# Patient Record
Sex: FEMALE | Race: WHITE | URBAN - METROPOLITAN AREA
[De-identification: names, ages, dates, MRNs, and addresses within clinical notes are randomized per-mention and may not be internally consistent; named-entity substitution may affect disease eponyms.]

---

## 2017-01-19 ENCOUNTER — TELEPHONE (OUTPATIENT)
Dept: INTERNAL MEDICINE CLINIC | Facility: CLINIC | Age: 47
End: 2017-01-19

## 2017-01-19 ENCOUNTER — LAB ENCOUNTER (OUTPATIENT)
Dept: LAB | Age: 47
End: 2017-01-19
Attending: INTERNAL MEDICINE
Payer: COMMERCIAL

## 2017-01-19 DIAGNOSIS — Z00.00 ROUTINE HEALTH MAINTENANCE: ICD-10-CM

## 2017-01-19 DIAGNOSIS — R53.83 FATIGUE, UNSPECIFIED TYPE: ICD-10-CM

## 2017-01-19 LAB
ALT SERPL-CCNC: 22 U/L (ref 14–54)
ANION GAP SERPL CALC-SCNC: 11 MMOL/L (ref 0–18)
AST SERPL-CCNC: 24 U/L (ref 15–41)
BASOPHILS # BLD: 0 K/UL (ref 0–0.2)
BASOPHILS NFR BLD: 0 %
BUN SERPL-MCNC: 11 MG/DL (ref 8–20)
BUN/CREAT SERPL: 14.3 (ref 10–20)
CALCIUM SERPL-MCNC: 9.5 MG/DL (ref 8.5–10.5)
CHLORIDE SERPL-SCNC: 101 MMOL/L (ref 95–110)
CHOLEST SERPL-MCNC: 157 MG/DL (ref 110–200)
CO2 SERPL-SCNC: 28 MMOL/L (ref 22–32)
CREAT SERPL-MCNC: 0.77 MG/DL (ref 0.5–1.5)
EOSINOPHIL # BLD: 0.1 K/UL (ref 0–0.7)
EOSINOPHIL NFR BLD: 1 %
ERYTHROCYTE [DISTWIDTH] IN BLOOD BY AUTOMATED COUNT: 11.3 % (ref 11–15)
GLUCOSE SERPL-MCNC: 93 MG/DL (ref 70–99)
HCT VFR BLD AUTO: 42.3 % (ref 35–48)
HDLC SERPL-MCNC: 59 MG/DL
HGB BLD-MCNC: 14.3 G/DL (ref 12–16)
LDLC SERPL CALC-MCNC: 90 MG/DL (ref 0–99)
LYMPHOCYTES # BLD: 1.1 K/UL (ref 1–4)
LYMPHOCYTES NFR BLD: 15 %
MCH RBC QN AUTO: 30 PG (ref 27–32)
MCHC RBC AUTO-ENTMCNC: 33.8 G/DL (ref 32–37)
MCV RBC AUTO: 88.5 FL (ref 80–100)
MONOCYTES # BLD: 0.4 K/UL (ref 0–1)
MONOCYTES NFR BLD: 6 %
NEUTROPHILS # BLD AUTO: 5.8 K/UL (ref 1.8–7.7)
NEUTROPHILS NFR BLD: 78 %
NONHDLC SERPL-MCNC: 98 MG/DL
OSMOLALITY UR CALC.SUM OF ELEC: 289 MOSM/KG (ref 275–295)
PLATELET # BLD AUTO: 243 K/UL (ref 140–400)
PMV BLD AUTO: 10 FL (ref 7.4–10.3)
POTASSIUM SERPL-SCNC: 4 MMOL/L (ref 3.3–5.1)
RBC # BLD AUTO: 4.78 M/UL (ref 3.7–5.4)
SODIUM SERPL-SCNC: 140 MMOL/L (ref 136–144)
TRIGL SERPL-MCNC: 38 MG/DL (ref 1–149)
TSH SERPL-ACNC: 0.92 UIU/ML (ref 0.34–5.6)
WBC # BLD AUTO: 7.4 K/UL (ref 4–11)

## 2017-01-19 PROCEDURE — 84460 ALANINE AMINO (ALT) (SGPT): CPT

## 2017-01-19 PROCEDURE — 36415 COLL VENOUS BLD VENIPUNCTURE: CPT

## 2017-01-19 PROCEDURE — 80061 LIPID PANEL: CPT

## 2017-01-19 PROCEDURE — 80048 BASIC METABOLIC PNL TOTAL CA: CPT

## 2017-01-19 PROCEDURE — 84443 ASSAY THYROID STIM HORMONE: CPT

## 2017-01-19 PROCEDURE — 82306 VITAMIN D 25 HYDROXY: CPT

## 2017-01-19 PROCEDURE — 85025 COMPLETE CBC W/AUTO DIFF WBC: CPT

## 2017-01-19 PROCEDURE — 84450 TRANSFERASE (AST) (SGOT): CPT

## 2017-01-20 LAB — 25(OH)D3 SERPL-MCNC: 47 NG/ML

## 2017-01-20 NOTE — TELEPHONE ENCOUNTER
Patient called back - relayed DR. ANDERSON message and she verbalized understanding.  Copy of labs mailed to patients home per request

## 2017-02-02 ENCOUNTER — HOSPITAL ENCOUNTER (OUTPATIENT)
Dept: MAMMOGRAPHY | Facility: HOSPITAL | Age: 47
Discharge: HOME | End: 2017-02-02
Attending: INTERNAL MEDICINE
Payer: COMMERCIAL

## 2017-02-02 DIAGNOSIS — Z12.31 ENCOUNTER FOR SCREENING MAMMOGRAM FOR BREAST CANCER: ICD-10-CM

## 2017-02-02 DIAGNOSIS — R92.2 DENSE BREASTS: ICD-10-CM

## 2017-02-02 PROCEDURE — 77063 BREAST TOMOSYNTHESIS BI: CPT

## 2017-02-02 PROCEDURE — 77067 SCR MAMMO BI INCL CAD: CPT

## 2017-02-07 ENCOUNTER — HOSPITAL ENCOUNTER (OUTPATIENT)
Dept: MAMMOGRAPHY | Facility: HOSPITAL | Age: 47
Discharge: HOME | End: 2017-02-07
Attending: INTERNAL MEDICINE
Payer: COMMERCIAL

## 2017-02-07 DIAGNOSIS — R92.8 ABNORMAL MAMMOGRAM: ICD-10-CM

## 2017-02-07 PROCEDURE — 77065 DX MAMMO INCL CAD UNI: CPT | Performed by: RADIOLOGY

## 2017-02-16 ENCOUNTER — TELEPHONE (OUTPATIENT)
Dept: INTERNAL MEDICINE CLINIC | Facility: CLINIC | Age: 47
End: 2017-02-16

## 2017-02-16 DIAGNOSIS — R92.0 MICROCALCIFICATION OF LEFT BREAST ON MAMMOGRAM: Primary | ICD-10-CM

## 2017-02-16 NOTE — TELEPHONE ENCOUNTER
Please let pt know that her additional mammo views were normal -- benign appearing calcifications. The radiologist, however, is recommending repeat views of left breast in 6 months to ensure that they remain stable.   I have ordered in the system -- please

## 2017-05-08 ENCOUNTER — TELEPHONE (OUTPATIENT)
Dept: INTERNAL MEDICINE CLINIC | Facility: CLINIC | Age: 47
End: 2017-05-08

## 2017-05-08 NOTE — TELEPHONE ENCOUNTER
Pt. Is calling to see if we have a booklet called 5 wishes (advanced directive) if so pt. Would like 5 copies left at the .   Ph. # [phone]    Routed to clinical

## 2017-05-08 NOTE — TELEPHONE ENCOUNTER
Called patient and explained that our office does not have booklet , but she could discuss with DR. ANDERSON - verbalized understanding

## 2017-09-26 ENCOUNTER — TELEPHONE (OUTPATIENT)
Dept: INTERNAL MEDICINE CLINIC | Facility: CLINIC | Age: 47
End: 2017-09-26

## 2017-09-26 NOTE — TELEPHONE ENCOUNTER
Pt is experiencing foot pain, it is getting more constant. Pt met her family deductible she would like to get Orthotics,   Pt would like a recommendation of a Dr she should see.    Please call [phone] ok to leave voicemail   Tasked to nursing

## 2017-09-27 NOTE — TELEPHONE ENCOUNTER
Called patient and relayed DR. S message # for DR. Rebeca Hitchcock given [phone] with address and number for DR. Nicole Mcclelland [phone] with address given -left on VM per hipaa

## 2017-12-06 ENCOUNTER — OFFICE VISIT (OUTPATIENT)
Dept: INTERNAL MEDICINE CLINIC | Facility: CLINIC | Age: 47
End: 2017-12-06

## 2017-12-06 VITALS
SYSTOLIC BLOOD PRESSURE: 114 MMHG | TEMPERATURE: 98 F | WEIGHT: 113 LBS | HEART RATE: 108 BPM | BODY MASS INDEX: 16.74 KG/M2 | HEIGHT: 69 IN | DIASTOLIC BLOOD PRESSURE: 76 MMHG

## 2017-12-06 DIAGNOSIS — Z00.00 ROUTINE HEALTH MAINTENANCE: Primary | ICD-10-CM

## 2017-12-06 DIAGNOSIS — Z78.0 POSTMENOPAUSE: ICD-10-CM

## 2017-12-06 DIAGNOSIS — R53.83 OTHER FATIGUE: ICD-10-CM

## 2017-12-06 DIAGNOSIS — R01.1 MURMUR: ICD-10-CM

## 2017-12-06 PROCEDURE — 99396 PREV VISIT EST AGE 40-64: CPT | Performed by: INTERNAL MEDICINE

## 2017-12-06 RX ORDER — MELATONIN
1000 DAILY
COMMUNITY
End: 2021-11-23

## 2017-12-06 NOTE — PROGRESS NOTES
Estefany Rey is a 49year old female. Patient presents with:  Physical: Patient is here today for a PE. Last pap exam was negative in 2/2014. She states that she has been feeling good lately.  She complains of a tenderness underneath her right breast and s LEFT   Family History   Problem Relation Age of Onset   • Depression Maternal Grandmother    • Migraines Sister    • Heart Disease Other      family h/o CAD   • Lipids Other    • Allergies Cousin    • Asthma Cousin       Social History:   Smoking status: N postmenopausal). Check labs. Lipids normal last year.      2. Dyspareunia in female  Due to vaginal dryness. KY jelly did not help. Rec Replens lubricant. Also discussed vaginal estrogen. Pt will think about it and let me know.      3.  Murmur  Check

## 2017-12-13 ENCOUNTER — TELEPHONE (OUTPATIENT)
Dept: INTERNAL MEDICINE CLINIC | Facility: CLINIC | Age: 47
End: 2017-12-13

## 2017-12-13 DIAGNOSIS — R92.2 DENSE BREASTS: ICD-10-CM

## 2017-12-13 DIAGNOSIS — R92.0 MICROCALCIFICATION OF LEFT BREAST ON MAMMOGRAM: Primary | ICD-10-CM

## 2017-12-13 NOTE — TELEPHONE ENCOUNTER
To Dr Shy eLi review message below.   Bilateral diagnostic mammogram order pended below with dx microcalcification of left breast

## 2017-12-13 NOTE — TELEPHONE ENCOUNTER
Per Eloy De Leon in [facility]  Pt has appt this Friday for left breast mammo  Can Dr Ihsan Cazares please order diagnostic bilateral for pt since she is due in Feb. Eloy De Leon states she will be covered under ins  To clinical

## 2017-12-15 ENCOUNTER — HOSPITAL ENCOUNTER (OUTPATIENT)
Dept: MAMMOGRAPHY | Facility: HOSPITAL | Age: 47
Discharge: HOME | End: 2017-12-15
Attending: INTERNAL MEDICINE
Payer: COMMERCIAL

## 2017-12-15 ENCOUNTER — HOSPITAL ENCOUNTER (OUTPATIENT)
Dept: BONE DENSITY | Facility: HOSPITAL | Age: 47
Discharge: HOME | End: 2017-12-15
Attending: INTERNAL MEDICINE
Payer: COMMERCIAL

## 2017-12-15 ENCOUNTER — HOSPITAL ENCOUNTER (OUTPATIENT)
Dept: CV DIAGNOSTICS | Facility: HOSPITAL | Age: 47
Discharge: HOME | End: 2017-12-15
Attending: INTERNAL MEDICINE
Payer: COMMERCIAL

## 2017-12-15 DIAGNOSIS — R01.1 MURMUR: ICD-10-CM

## 2017-12-15 DIAGNOSIS — Z78.0 POSTMENOPAUSE: ICD-10-CM

## 2017-12-15 DIAGNOSIS — R92.0 MICROCALCIFICATION OF LEFT BREAST ON MAMMOGRAM: ICD-10-CM

## 2017-12-15 PROCEDURE — 93306 TTE W/DOPPLER COMPLETE: CPT | Performed by: INTERNAL MEDICINE

## 2017-12-15 PROCEDURE — 77080 DXA BONE DENSITY AXIAL: CPT | Performed by: INTERNAL MEDICINE

## 2017-12-15 PROCEDURE — 77065 DX MAMMO INCL CAD UNI: CPT | Performed by: INTERNAL MEDICINE

## 2017-12-26 ENCOUNTER — PATIENT MESSAGE (OUTPATIENT)
Dept: INTERNAL MEDICINE CLINIC | Facility: CLINIC | Age: 47
End: 2017-12-26

## 2018-02-26 ENCOUNTER — TELEPHONE (OUTPATIENT)
Dept: INTERNAL MEDICINE CLINIC | Facility: CLINIC | Age: 48
End: 2018-02-26

## 2018-02-26 DIAGNOSIS — N39.0 ACUTE UTI: Primary | ICD-10-CM

## 2018-02-26 DIAGNOSIS — R74.8 ELEVATED LIVER ENZYMES: ICD-10-CM

## 2018-02-26 RX ORDER — NITROFURANTOIN 25; 75 MG/1; MG/1
100 CAPSULE ORAL 2 TIMES DAILY
Qty: 10 CAPSULE | Refills: 0 | Status: SHIPPED | OUTPATIENT
Start: 2018-02-26 | End: 2018-04-12

## 2018-02-26 NOTE — TELEPHONE ENCOUNTER
To nursing, I note message--Dysuria and cloudy urine–UA and urine culture ordered. Can send in Rx Macrobid 100 mg twice daily #10. Thanks.     - URINE CULTURE, ROUTINE; Future  - URINALYSIS, ROUTINE; Future

## 2018-02-26 NOTE — TELEPHONE ENCOUNTER
[facility]. I[facility]Y message - left on vM  , RX sent

## 2018-02-26 NOTE — TELEPHONE ENCOUNTER
Spoke with patient who reports urinary sx that started about 3 days ago. +burning, +urgency, +cloudy urine. Denies fever or chills. No frequency. To DR. IRIZARRY: can you advise in DR. Fabian absence? May leave voice message.

## 2018-02-27 ENCOUNTER — LAB ENCOUNTER (OUTPATIENT)
Dept: LAB | Age: 48
End: 2018-02-27
Attending: INTERNAL MEDICINE
Payer: COMMERCIAL

## 2018-02-27 DIAGNOSIS — R53.83 OTHER FATIGUE: ICD-10-CM

## 2018-02-27 DIAGNOSIS — Z00.00 ROUTINE HEALTH MAINTENANCE: ICD-10-CM

## 2018-02-27 DIAGNOSIS — N39.0 ACUTE UTI: ICD-10-CM

## 2018-02-27 LAB
ALBUMIN SERPL BCP-MCNC: 4.4 G/DL (ref 3.5–4.8)
ALBUMIN/GLOB SERPL: 2 {RATIO} (ref 1–2)
ALP SERPL-CCNC: 74 U/L (ref 32–100)
ALT SERPL-CCNC: 66 U/L (ref 14–54)
ANION GAP SERPL CALC-SCNC: 9 MMOL/L (ref 0–18)
AST SERPL-CCNC: 33 U/L (ref 15–41)
BACTERIA UR QL AUTO: NEGATIVE /HPF
BASOPHILS # BLD: 0 K/UL (ref 0–0.2)
BASOPHILS NFR BLD: 1 %
BILIRUB SERPL-MCNC: 0.8 MG/DL (ref 0.3–1.2)
BILIRUB UR QL: NEGATIVE
BUN SERPL-MCNC: 10 MG/DL (ref 8–20)
BUN/CREAT SERPL: 14.1 (ref 10–20)
CALCIUM SERPL-MCNC: 9.3 MG/DL (ref 8.5–10.5)
CHLORIDE SERPL-SCNC: 103 MMOL/L (ref 95–110)
CLARITY UR: CLEAR
CO2 SERPL-SCNC: 30 MMOL/L (ref 22–32)
COLOR UR: YELLOW
CREAT SERPL-MCNC: 0.71 MG/DL (ref 0.5–1.5)
EOSINOPHIL # BLD: 0.3 K/UL (ref 0–0.7)
EOSINOPHIL NFR BLD: 4 %
ERYTHROCYTE [DISTWIDTH] IN BLOOD BY AUTOMATED COUNT: 11.8 % (ref 11–15)
GLOBULIN PLAS-MCNC: 2.2 G/DL (ref 2.5–3.7)
GLUCOSE SERPL-MCNC: 90 MG/DL (ref 70–99)
GLUCOSE UR-MCNC: NEGATIVE MG/DL
HCT VFR BLD AUTO: 43.5 % (ref 35–48)
HGB BLD-MCNC: 14.4 G/DL (ref 12–16)
KETONES UR-MCNC: NEGATIVE MG/DL
LYMPHOCYTES # BLD: 1.1 K/UL (ref 1–4)
LYMPHOCYTES NFR BLD: 14 %
MCH RBC QN AUTO: 29.8 PG (ref 27–32)
MCHC RBC AUTO-ENTMCNC: 33.1 G/DL (ref 32–37)
MCV RBC AUTO: 89.9 FL (ref 80–100)
MONOCYTES # BLD: 0.5 K/UL (ref 0–1)
MONOCYTES NFR BLD: 6 %
NEUTROPHILS # BLD AUTO: 6.1 K/UL (ref 1.8–7.7)
NEUTROPHILS NFR BLD: 76 %
NITRITE UR QL STRIP.AUTO: NEGATIVE
OSMOLALITY UR CALC.SUM OF ELEC: 293 MOSM/KG (ref 275–295)
PATIENT FASTING: YES
PH UR: 7 [PH] (ref 5–8)
PLATELET # BLD AUTO: 184 K/UL (ref 140–400)
PMV BLD AUTO: 10.2 FL (ref 7.4–10.3)
POTASSIUM SERPL-SCNC: 4.1 MMOL/L (ref 3.3–5.1)
PROT SERPL-MCNC: 6.6 G/DL (ref 5.9–8.4)
PROT UR-MCNC: NEGATIVE MG/DL
RBC # BLD AUTO: 4.84 M/UL (ref 3.7–5.4)
RBC #/AREA URNS AUTO: 4 /HPF
SODIUM SERPL-SCNC: 142 MMOL/L (ref 136–144)
SP GR UR STRIP: 1 (ref 1–1.03)
TSH SERPL-ACNC: 1.35 UIU/ML (ref 0.45–5.33)
UROBILINOGEN UR STRIP-ACNC: <2
VIT C UR-MCNC: NEGATIVE MG/DL
WBC # BLD AUTO: 8.1 K/UL (ref 4–11)
WBC #/AREA URNS AUTO: 39 /HPF

## 2018-02-27 PROCEDURE — 36415 COLL VENOUS BLD VENIPUNCTURE: CPT

## 2018-02-27 PROCEDURE — 87086 URINE CULTURE/COLONY COUNT: CPT

## 2018-02-27 PROCEDURE — 81001 URINALYSIS AUTO W/SCOPE: CPT

## 2018-02-27 PROCEDURE — 84443 ASSAY THYROID STIM HORMONE: CPT

## 2018-02-27 PROCEDURE — 80053 COMPREHEN METABOLIC PANEL: CPT

## 2018-02-27 PROCEDURE — 82306 VITAMIN D 25 HYDROXY: CPT

## 2018-02-27 PROCEDURE — 85025 COMPLETE CBC W/AUTO DIFF WBC: CPT

## 2018-02-28 LAB — 25(OH)D3 SERPL-MCNC: 61.5 NG/ML

## 2018-02-28 NOTE — TELEPHONE ENCOUNTER
Spoke with patient and relayed Dr. Yann Gabriel' message that she does not need to start Macrobid if sx have resolved. Patient notified to call back if symptoms return. She verbalized an understanding to this.    Routed to Dr. Yann Gabriel- patient is looking for r

## 2018-02-28 NOTE — TELEPHONE ENCOUNTER
(pt called today per Handy LOWERY who entered msg under 12/13/17 template-\"Pt wanting ua results that was done yesterday  Has not fill rx as yet but alsostates she is no longer having sx wondering what she should do ).          Raquel Rose, pt called Monday 2/26

## 2018-02-28 NOTE — TELEPHONE ENCOUNTER
Pt wanting ua results that was done yesterday  Has not fill rx as yet but alsostates she is no longer having sx wondering what she should do

## 2018-03-01 NOTE — TELEPHONE ENCOUNTER
Patient called back. Relayed message form Dr. Daniel Ford. She verbalized understanding of information and instructions. Patient agreeable to plan.  States she has been taking Aleve daily for 6-8 mos for neck and back tightness and wonders if this could be con

## 2018-03-01 NOTE — TELEPHONE ENCOUNTER
Labs normal except one of her liver enzymes is slightly elevated. Sometimes can see this with a recent viral infection. Would just limit intake of alcohol and tylenol and repeat labs in 2 weeks (nonfasting).

## 2018-03-02 NOTE — TELEPHONE ENCOUNTER
As long as she tolerates the Aleve, it is okay to take. Would just recommend that she take the lowest dose that controls her sx. Totally agree with the Aleve not causing the LFT elevation. Thank you!

## 2018-03-02 NOTE — TELEPHONE ENCOUNTER
Please advise for DR. ANDERSON  - patient had UA on Tuesday - no SX on Wednesday , now burning when urinating , no urgency or frequency . Should she start Macrobid that was ordered? - to DR. HE  Please leave a message if patient cannot be reached

## 2018-03-02 NOTE — TELEPHONE ENCOUNTER
JAYAM per patient instructions with Dr. Joanna Simon message. Instructed patient to call with questions.

## 2018-03-16 ENCOUNTER — APPOINTMENT (OUTPATIENT)
Dept: LAB | Age: 48
End: 2018-03-16
Attending: INTERNAL MEDICINE
Payer: COMMERCIAL

## 2018-03-16 DIAGNOSIS — R74.8 ELEVATED LIVER ENZYMES: ICD-10-CM

## 2018-03-16 LAB
ALBUMIN SERPL BCP-MCNC: 4.5 G/DL (ref 3.5–4.8)
ALP SERPL-CCNC: 64 U/L (ref 32–100)
ALT SERPL-CCNC: 20 U/L (ref 14–54)
AST SERPL-CCNC: 23 U/L (ref 15–41)
BILIRUB DIRECT SERPL-MCNC: 0.1 MG/DL (ref 0–0.2)
BILIRUB SERPL-MCNC: 1 MG/DL (ref 0.3–1.2)
PROT SERPL-MCNC: 6.6 G/DL (ref 5.9–8.4)

## 2018-03-16 PROCEDURE — 36415 COLL VENOUS BLD VENIPUNCTURE: CPT

## 2018-03-16 PROCEDURE — 80076 HEPATIC FUNCTION PANEL: CPT

## 2018-03-21 ENCOUNTER — PATIENT MESSAGE (OUTPATIENT)
Dept: INTERNAL MEDICINE CLINIC | Facility: CLINIC | Age: 48
End: 2018-03-21

## 2018-03-21 NOTE — TELEPHONE ENCOUNTER
From: Bird Norris  To:  Vidhi Morales MD  Sent: 3/21/2018 12:48 PM CDT  Subject: Non-Urgent Medical Question    Good afternoon, Dr. Saeed Ballard -    A while back you suggested I try Replens for vaginal dryness, and you also mentioned a topical estrogen

## 2018-03-26 ENCOUNTER — TELEPHONE (OUTPATIENT)
Dept: INTERNAL MEDICINE CLINIC | Facility: CLINIC | Age: 48
End: 2018-03-26

## 2018-03-26 NOTE — TELEPHONE ENCOUNTER
Please put in order for TB test - patient will come in on 4/10 for nurse visit thanks - to DR. Isamar Salas

## 2018-03-26 NOTE — TELEPHONE ENCOUNTER
Pt is coming on 4/10 for a TB test. Does she need an order? She will see  on 4/12 for a physical (she started a new job). She had a physical in Dec 2017 - not sure if this visit can be coded differently.      To Nursing

## 2018-03-27 NOTE — TELEPHONE ENCOUNTER
PPD test ordered. She was here for a PE in 12/2017, so that's how I coded it. I can't re-code it as something else just because she is coming again in April.

## 2018-04-04 ENCOUNTER — TELEPHONE (OUTPATIENT)
Dept: INTERNAL MEDICINE CLINIC | Facility: CLINIC | Age: 48
End: 2018-04-04

## 2018-04-04 NOTE — TELEPHONE ENCOUNTER
Pt asking if  feels comfortable filling out a very simple form for her employment, its a one page form and just asking if she is capable of working and is healthy and need to be dated anywhere from 3/18/ to 4/18. Pt have apt for 4/12/18 for yearly.  She

## 2018-04-05 NOTE — TELEPHONE ENCOUNTER
I can certainly fill out the form, but I would need to keep the date that the exam was done -- ie. December 2017. I cannot change the date. 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   If the form requires that she be seen between March and April 2018, then she

## 2018-04-05 NOTE — TELEPHONE ENCOUNTER
To Dr. Esha Stover - since pt had annual PE in December do you still need to see pt in order to complete form? Would require you to backdate. Pt will start  job 4/16 - need to keep 4/12 appt?   Does not require TB test, labs, etc.  If 4/1

## 2018-04-05 NOTE — TELEPHONE ENCOUNTER
Relayed Dr. Keri Medrano message to patient. She verbalized understanding and states she will keep her appt for 4/12.

## 2018-04-12 ENCOUNTER — OFFICE VISIT (OUTPATIENT)
Dept: INTERNAL MEDICINE CLINIC | Facility: CLINIC | Age: 48
End: 2018-04-12

## 2018-04-12 VITALS
HEIGHT: 69 IN | WEIGHT: 119 LBS | TEMPERATURE: 98 F | DIASTOLIC BLOOD PRESSURE: 78 MMHG | BODY MASS INDEX: 17.63 KG/M2 | SYSTOLIC BLOOD PRESSURE: 130 MMHG | HEART RATE: 104 BPM

## 2018-04-12 DIAGNOSIS — N95.2 ATROPHIC VAGINITIS: ICD-10-CM

## 2018-04-12 DIAGNOSIS — Z00.00 ROUTINE HEALTH MAINTENANCE: Primary | ICD-10-CM

## 2018-04-12 PROCEDURE — 99396 PREV VISIT EST AGE 40-64: CPT | Performed by: INTERNAL MEDICINE

## 2018-04-12 RX ORDER — ESTRADIOL 0.1 MG/G
CREAM VAGINAL
Qty: 1 TUBE | Refills: 11 | Status: SHIPPED | OUTPATIENT
Start: 2018-04-12 | End: 2019-07-29 | Stop reason: ALTCHOICE

## 2018-04-12 NOTE — PROGRESS NOTES
Fe Hilton is a 49year old female. Patient presents with:  Physical: Patient is here today for a PE (needed for work). She states that she has been feeling good lately. No issues at this time.        HPI:   Fe Hilton is a 49year old female who pre week.         REVIEW OF SYSTEMS:   GENERAL: feels well otherwise  SKIN: denies any unusual skin lesions  EYES:denies blurred vision or double vision  HEENT: denies nasal congestion, sinus pain or ST  LUNGS: denies shortness of breath with exertion or cough

## 2018-06-14 ENCOUNTER — TELEPHONE (OUTPATIENT)
Dept: INTERNAL MEDICINE CLINIC | Facility: CLINIC | Age: 48
End: 2018-06-14

## 2018-06-14 NOTE — TELEPHONE ENCOUNTER
She will need to see a gynecologist re postmenopausal bleeding -- could be due to the vaginal estrogen, but I cannot say for sure.   Please ask her to schedule appt with Dr. Wilfredo Rocha

## 2018-06-14 NOTE — TELEPHONE ENCOUNTER
To Dr. Daniel Ford -  Last period 8/2016. Monday noticed blood when wiping after voiding. No burning on urination/fever/chills. Used panty liner - sm amt blood Monday/Tuesday. Monday-Wed - passing small clots, largest dime-sized.   Last night 9 PM menstrual

## 2018-06-14 NOTE — TELEPHONE ENCOUNTER
Pt states she is menopausing and has been bleed more than usual. Last night had cramps, none today. Best call back is [phone]. Tasked to nursing.

## 2018-06-15 NOTE — TELEPHONE ENCOUNTER
Spoke with patient and relayed Dr. Rosales Nguyen' message. Patient verbalized an understanding and will f/u with former gyne Dr. Kylie Allen.

## 2019-06-19 ENCOUNTER — OFFICE VISIT (OUTPATIENT)
Dept: DERMATOLOGY CLINIC | Facility: CLINIC | Age: 49
End: 2019-06-19
Payer: COMMERCIAL

## 2019-06-19 DIAGNOSIS — D23.60 BENIGN NEOPLASM OF SKIN OF UPPER LIMB, INCLUDING SHOULDER, UNSPECIFIED LATERALITY: ICD-10-CM

## 2019-06-19 DIAGNOSIS — D23.4 BENIGN NEOPLASM OF SCALP AND SKIN OF NECK: ICD-10-CM

## 2019-06-19 DIAGNOSIS — D23.70 BENIGN NEOPLASM OF SKIN OF LOWER LIMB, INCLUDING HIP, UNSPECIFIED LATERALITY: ICD-10-CM

## 2019-06-19 DIAGNOSIS — L30.8 PSORIASIFORM DERMATITIS: ICD-10-CM

## 2019-06-19 DIAGNOSIS — D23.30 BENIGN NEOPLASM OF SKIN OF FACE: ICD-10-CM

## 2019-06-19 DIAGNOSIS — D23.5 BENIGN NEOPLASM OF SKIN OF TRUNK, EXCEPT SCROTUM: Primary | ICD-10-CM

## 2019-06-19 PROCEDURE — 99212 OFFICE O/P EST SF 10 MIN: CPT | Performed by: DERMATOLOGY

## 2019-06-19 PROCEDURE — 99203 OFFICE O/P NEW LOW 30 MIN: CPT | Performed by: DERMATOLOGY

## 2019-06-30 NOTE — PROGRESS NOTES
Marco Britton is a 47year old female. HPI:     CC:  Patient presents with:  Full Skin Exam: LOV 4/17/2013 Patient present for full body skin exam . Patient denies any personal hx of sc . Patient has family hx of sc        Allergies:  Patient has no known Laterality Date   • CYST ASPIRATION LEFT  2005     Social History    Socioeconomic History      Marital status:       Spouse name: Not on file      Number of children: Not on file      Years of education: Not on file      Highest education level: No Seat Belt: Not Asked        Self-Exams: Not Asked        Grew up on a farm: Not Asked        History of tanning: Not Asked        Outdoor occupation: Not Asked        Breast feeding: Not Asked        Reaction to local anesthetic: No    Social History Aide Mancia date for lesions noted . Otherwise remarkable for lesions as noted on map.   See details of examination  See Assessment /Plan for additional history and physical exam also:    Assessment / plan:    No orders of the defined types were placed in this enc contact me regarding any confusion resulting from errors in recognition.

## 2019-07-29 ENCOUNTER — OFFICE VISIT (OUTPATIENT)
Dept: INTERNAL MEDICINE CLINIC | Facility: CLINIC | Age: 49
End: 2019-07-29
Payer: COMMERCIAL

## 2019-07-29 VITALS
TEMPERATURE: 98 F | BODY MASS INDEX: 16 KG/M2 | WEIGHT: 111 LBS | OXYGEN SATURATION: 98 % | RESPIRATION RATE: 12 BRPM | HEART RATE: 73 BPM | DIASTOLIC BLOOD PRESSURE: 72 MMHG | SYSTOLIC BLOOD PRESSURE: 120 MMHG

## 2019-07-29 DIAGNOSIS — S01.01XS LACERATION OF SCALP, SEQUELA: Primary | ICD-10-CM

## 2019-07-29 PROCEDURE — 99213 OFFICE O/P EST LOW 20 MIN: CPT | Performed by: INTERNAL MEDICINE

## 2019-07-29 NOTE — PROGRESS NOTES
Hayley Flaherty is a 47year old female.     HPI:   Patient presents with:  Staple Removal: Pt presents for staple removal.       46 y/o F who had fall down stairs on 7/20/19; had 2 staples placed in [address];  no CP; no SOB; no headaches; no palpitat Negative for cough, dyspnea and wheezing  Gastrointestinal:  Negative for abdominal pain, constipation, decreased appetite, diarrhea and vomiting; no melena or hematochezia  All other review of systems are negative.         PHYSICAL EXAM:   Blood pressure 1

## 2019-08-07 ENCOUNTER — TELEPHONE (OUTPATIENT)
Dept: INTERNAL MEDICINE CLINIC | Facility: CLINIC | Age: 49
End: 2019-08-07

## 2019-08-07 NOTE — TELEPHONE ENCOUNTER
Would recommend seeing if other providers would be available; Dr. John Rossi has availability during daytime.

## 2019-08-07 NOTE — TELEPHONE ENCOUNTER
I spoke with patient and added her to next Tuesday at 5:00 p.m. With Dr. Ralf Gaming. Appointment scheduled.

## 2019-08-07 NOTE — TELEPHONE ENCOUNTER
Pt. Is starting a new job on 08/19 and needs an employee physical and TB test. Dr. David Shah has no opening please advise.  Lety Whitman is willing to see a different Dr. Maribel Hatch. # [phone]   Routed to clinical

## 2019-08-07 NOTE — TELEPHONE ENCOUNTER
I spoke with patient and she does not want to wait until next week to talk to Dr. Nikos Holliday about having a physical. She would like to have something scheduled and an appointment in the evening would be best. Explained that Dr. Nikos Holliday is out of the office

## 2019-08-13 ENCOUNTER — OFFICE VISIT (OUTPATIENT)
Dept: INTERNAL MEDICINE CLINIC | Facility: CLINIC | Age: 49
End: 2019-08-13
Payer: COMMERCIAL

## 2019-08-13 VITALS
HEIGHT: 69 IN | HEART RATE: 80 BPM | WEIGHT: 109 LBS | TEMPERATURE: 98 F | SYSTOLIC BLOOD PRESSURE: 122 MMHG | DIASTOLIC BLOOD PRESSURE: 70 MMHG | BODY MASS INDEX: 16.14 KG/M2

## 2019-08-13 DIAGNOSIS — M85.89 OSTEOPENIA OF MULTIPLE SITES: ICD-10-CM

## 2019-08-13 DIAGNOSIS — Z00.00 ROUTINE HEALTH MAINTENANCE: Primary | ICD-10-CM

## 2019-08-13 LAB — INDURATION (): 0 MM (ref 0–11)

## 2019-08-13 PROCEDURE — 90471 IMMUNIZATION ADMIN: CPT | Performed by: INTERNAL MEDICINE

## 2019-08-13 PROCEDURE — 86580 TB INTRADERMAL TEST: CPT | Performed by: INTERNAL MEDICINE

## 2019-08-13 PROCEDURE — 90715 TDAP VACCINE 7 YRS/> IM: CPT | Performed by: INTERNAL MEDICINE

## 2019-08-13 PROCEDURE — 99396 PREV VISIT EST AGE 40-64: CPT | Performed by: INTERNAL MEDICINE

## 2019-08-13 NOTE — PROGRESS NOTES
Gifty Montero is a 47year old female. Patient presents with:  Physical: Patient is here today for a PE.  She will be starting a new job on 8/19 and needs a physical exam and TB test. Patient is due for pap today but prefers to wait until she has new insura Social History:   Social History    Tobacco Use      Smoking status: Never Smoker      Smokeless tobacco: Never Used    Alcohol use:  Yes      Alcohol/week: 1.0 standard drinks      Types: 1 Glasses of wine per week      Comment: Approximately 2 glasse Postmenopausal bleeding  LMP was 8/2016. Saw Dr. Baldomero Barajas earlier this year b/c she had a period; had u/s and D&C (?) in 11/2018 -- normal per pt. RTC 1 yr.      Guerrero Collins, 08/13/19, 5:40 PM

## 2020-01-15 ENCOUNTER — TELEPHONE (OUTPATIENT)
Dept: INTERNAL MEDICINE CLINIC | Facility: CLINIC | Age: 50
End: 2020-01-15

## 2020-01-15 DIAGNOSIS — H26.9 CATARACT OF BOTH EYES, UNSPECIFIED CATARACT TYPE: Primary | ICD-10-CM

## 2020-01-15 DIAGNOSIS — Z12.11 SCREEN FOR COLON CANCER: Primary | ICD-10-CM

## 2020-01-15 DIAGNOSIS — M85.89 OSTEOPENIA OF MULTIPLE SITES: ICD-10-CM

## 2020-01-15 DIAGNOSIS — R53.83 OTHER FATIGUE: ICD-10-CM

## 2020-01-15 DIAGNOSIS — Z00.00 ANNUAL PHYSICAL EXAM: ICD-10-CM

## 2020-01-15 DIAGNOSIS — R92.2 DENSE BREASTS: ICD-10-CM

## 2020-01-15 DIAGNOSIS — Z12.39 SCREENING FOR BREAST CANCER: Primary | ICD-10-CM

## 2020-01-15 NOTE — TELEPHONE ENCOUNTER
Pt requesting referral/recommendation for:  Ophthalmologist for future cataract surgery  Pt current ophthamologist is Dr Juan Lindsey recently changed to an HMO and Dr Delorise Babinski  not in [facility] [place]  She would love to stay with that practice if

## 2020-01-15 NOTE — TELEPHONE ENCOUNTER
Pt requesting orders for:  Labs and mammogram  Pt was seen in August but orders not entered   Please call pt when orders in place   Tasked to nursing

## 2020-07-30 ENCOUNTER — TELEPHONE (OUTPATIENT)
Dept: INTERNAL MEDICINE CLINIC | Facility: CLINIC | Age: 50
End: 2020-07-30

## 2020-07-30 DIAGNOSIS — M79.671 PAIN IN BOTH FEET: ICD-10-CM

## 2020-07-30 DIAGNOSIS — Q66.70 HIGH ARCH: Primary | ICD-10-CM

## 2020-07-30 DIAGNOSIS — M79.672 PAIN IN BOTH FEET: ICD-10-CM

## 2020-07-30 NOTE — TELEPHONE ENCOUNTER
To Dr. Michelle Molina - see below, pended. Could not find record of prior referral in [facility].

## 2020-07-30 NOTE — TELEPHONE ENCOUNTER
Patient is calling to request a referral for a Podiatrist, she thinks she saw Dr Orien Crigler in the past and would like to see her again  She needs Orthodics    She is aware Dr Lan Edwards is out of the office okay to wait    Patient can be reached at [phone]

## 2020-08-05 NOTE — TELEPHONE ENCOUNTER
Patient called back - relayed DR. ANDERSON message that referral for podiatrist is authorized .  Phone number given to patient

## 2020-08-07 ENCOUNTER — TELEPHONE (OUTPATIENT)
Dept: INTERNAL MEDICINE CLINIC | Facility: CLINIC | Age: 50
End: 2020-08-07

## 2020-08-07 NOTE — TELEPHONE ENCOUNTER
To Dr. Lupillo French to advise--  RN provided testing site location and hours to [facility] to patient per pt request. Pt is asymptomatic.  Pt is ware to call our office if she develops fever, cough, sore throat, etc. Pt stated she is okay to wait for MD to a

## 2020-08-07 NOTE — TELEPHONE ENCOUNTER
Has had regular contact with a friend who was exposed to covid by their roommate   - roommate was tested but they do not have results yet    Pt feels fine and wants direction   Goes back to work on 8/17   Should she get tested    Please advise [phone]

## 2020-08-09 NOTE — TELEPHONE ENCOUNTER
Would await test results of the person exposed.   If positive, then recommend testing for [facility]

## 2020-08-10 NOTE — TELEPHONE ENCOUNTER
Pt returned call  Pt said no one in family is showing COVID symptoms  Pt spouse was feeling fatigued, after a few nights of rest feels better this morning  They have been taking temperatures, not fevers  Ok to leave detailed message, pt will call back  Tas

## 2020-08-13 NOTE — TELEPHONE ENCOUNTER
Eber Cowan called with update for Dr Darek Banks   The person she came in contact with & that persons roommate both received negative covid results

## 2020-08-18 ENCOUNTER — TELEPHONE (OUTPATIENT)
Dept: INTERNAL MEDICINE CLINIC | Facility: CLINIC | Age: 50
End: 2020-08-18

## 2020-08-18 DIAGNOSIS — H02.849 SWELLING OF EYELID, UNSPECIFIED LATERALITY: ICD-10-CM

## 2020-08-18 DIAGNOSIS — H57.89 DISCHARGE FROM EYE: ICD-10-CM

## 2020-08-18 DIAGNOSIS — H57.89 BURNING SENSATION OF EYE: ICD-10-CM

## 2020-08-18 DIAGNOSIS — H53.8 BLURRY VISION, BILATERAL: Primary | ICD-10-CM

## 2020-08-18 DIAGNOSIS — H04.129 EYE DRYNESS: ICD-10-CM

## 2020-08-18 DIAGNOSIS — H00.016 HORDEOLUM EXTERNUM OF LEFT EYE, UNSPECIFIED EYELID: ICD-10-CM

## 2020-08-18 NOTE — TELEPHONE ENCOUNTER
Spoke to patient who reports since last Wednesday her eyes have been very dry, are burning, lots of yellow mucus discharge. In the morning when she wakes up her eyes are sealed shut.  Her vision is a little blurry until she clears off the mucus with a Qtip

## 2020-08-18 NOTE — TELEPHONE ENCOUNTER
Per MD - Spoke with pt and advised UC for multiple eye problems. Pt prefers to wait for referral to be authorized. She reports when wearing mask and glasses fog, this warm steam-like sensation helps eye.  Signed [facility] for Dr. Britney Arreaga (Opthamology) as ad

## 2020-08-18 NOTE — TELEPHONE ENCOUNTER
Pt. Called asking if Dr. Darek Banks will give her a referral to see an eye Dr.  She has been having mucous, painful, burning, blood shot in both eyes. She also states her tears do not flow out, they collect in her eyelids and they get swollen.   Pt. Can be r

## 2020-08-19 ENCOUNTER — HOSPITAL ENCOUNTER (OUTPATIENT)
Dept: MAMMOGRAPHY | Facility: HOSPITAL | Age: 50
Discharge: HOME | End: 2020-08-19
Attending: INTERNAL MEDICINE
Payer: COMMERCIAL

## 2020-08-19 DIAGNOSIS — Z12.39 SCREENING FOR BREAST CANCER: ICD-10-CM

## 2020-08-19 DIAGNOSIS — R92.2 DENSE BREASTS: ICD-10-CM

## 2020-08-19 PROCEDURE — 77063 BREAST TOMOSYNTHESIS BI: CPT | Performed by: INTERNAL MEDICINE

## 2020-08-19 PROCEDURE — 77067 SCR MAMMO BI INCL CAD: CPT | Performed by: INTERNAL MEDICINE

## 2020-08-19 NOTE — TELEPHONE ENCOUNTER
Please call pt  Has gotten appt with Dr Fiorella Cummings Opthamology on Friday, 8/19/20  Can new referral be entered for this appt?   Please call pt with any questions  Tasked to nursing

## 2020-09-05 ENCOUNTER — LAB ENCOUNTER (OUTPATIENT)
Dept: LAB | Age: 50
End: 2020-09-05
Attending: INTERNAL MEDICINE
Payer: COMMERCIAL

## 2020-09-05 DIAGNOSIS — R53.83 OTHER FATIGUE: ICD-10-CM

## 2020-09-05 DIAGNOSIS — M85.89 OSTEOPENIA OF MULTIPLE SITES: ICD-10-CM

## 2020-09-05 DIAGNOSIS — Z00.00 ANNUAL PHYSICAL EXAM: ICD-10-CM

## 2020-09-05 LAB
ALBUMIN SERPL-MCNC: 4.1 G/DL (ref 3.4–5)
ALBUMIN/GLOB SERPL: 1.4 {RATIO} (ref 1–2)
ALP LIVER SERPL-CCNC: 69 U/L (ref 41–108)
ALT SERPL-CCNC: 23 U/L (ref 13–56)
ANION GAP SERPL CALC-SCNC: 6 MMOL/L (ref 0–18)
AST SERPL-CCNC: 10 U/L (ref 15–37)
BASOPHILS # BLD AUTO: 0.05 X10(3) UL (ref 0–0.2)
BASOPHILS NFR BLD AUTO: 1.3 %
BILIRUB SERPL-MCNC: 0.6 MG/DL (ref 0.1–2)
BUN BLD-MCNC: 16 MG/DL (ref 7–18)
BUN/CREAT SERPL: 18 (ref 10–20)
CALCIUM BLD-MCNC: 9 MG/DL (ref 8.5–10.1)
CHLORIDE SERPL-SCNC: 105 MMOL/L (ref 98–112)
CHOLEST SMN-MCNC: 188 MG/DL (ref ?–200)
CO2 SERPL-SCNC: 31 MMOL/L (ref 21–32)
CREAT BLD-MCNC: 0.89 MG/DL (ref 0.55–1.02)
DEPRECATED RDW RBC AUTO: 36.6 FL (ref 35.1–46.3)
EOSINOPHIL # BLD AUTO: 0.14 X10(3) UL (ref 0–0.7)
EOSINOPHIL NFR BLD AUTO: 3.5 %
ERYTHROCYTE [DISTWIDTH] IN BLOOD BY AUTOMATED COUNT: 11.1 % (ref 11–15)
GLOBULIN PLAS-MCNC: 2.9 G/DL (ref 2.8–4.4)
GLUCOSE BLD-MCNC: 74 MG/DL (ref 70–99)
HCT VFR BLD AUTO: 44.5 % (ref 35–48)
HDLC SERPL-MCNC: 63 MG/DL (ref 40–59)
HGB BLD-MCNC: 14.9 G/DL (ref 12–16)
IMM GRANULOCYTES # BLD AUTO: 0.01 X10(3) UL (ref 0–1)
IMM GRANULOCYTES NFR BLD: 0.3 %
LDLC SERPL CALC-MCNC: 114 MG/DL (ref ?–100)
LYMPHOCYTES # BLD AUTO: 1.31 X10(3) UL (ref 1–4)
LYMPHOCYTES NFR BLD AUTO: 33.1 %
M PROTEIN MFR SERPL ELPH: 7 G/DL (ref 6.4–8.2)
MCH RBC QN AUTO: 30.1 PG (ref 26–34)
MCHC RBC AUTO-ENTMCNC: 33.5 G/DL (ref 31–37)
MCV RBC AUTO: 89.9 FL (ref 80–100)
MONOCYTES # BLD AUTO: 0.28 X10(3) UL (ref 0.1–1)
MONOCYTES NFR BLD AUTO: 7.1 %
NEUTROPHILS # BLD AUTO: 2.17 X10 (3) UL (ref 1.5–7.7)
NEUTROPHILS # BLD AUTO: 2.17 X10(3) UL (ref 1.5–7.7)
NEUTROPHILS NFR BLD AUTO: 54.7 %
NONHDLC SERPL-MCNC: 125 MG/DL (ref ?–130)
OSMOLALITY SERPL CALC.SUM OF ELEC: 294 MOSM/KG (ref 275–295)
PATIENT FASTING Y/N/NP: YES
PATIENT FASTING Y/N/NP: YES
PLATELET # BLD AUTO: 182 10(3)UL (ref 150–450)
POTASSIUM SERPL-SCNC: 4.1 MMOL/L (ref 3.5–5.1)
RBC # BLD AUTO: 4.95 X10(6)UL (ref 3.8–5.3)
SODIUM SERPL-SCNC: 142 MMOL/L (ref 136–145)
TRIGL SERPL-MCNC: 57 MG/DL (ref 30–149)
TSI SER-ACNC: 1.67 MIU/ML (ref 0.36–3.74)
VLDLC SERPL CALC-MCNC: 11 MG/DL (ref 0–30)
WBC # BLD AUTO: 4 X10(3) UL (ref 4–11)

## 2020-09-05 PROCEDURE — 85025 COMPLETE CBC W/AUTO DIFF WBC: CPT

## 2020-09-05 PROCEDURE — 80053 COMPREHEN METABOLIC PANEL: CPT

## 2020-09-05 PROCEDURE — 80061 LIPID PANEL: CPT

## 2020-09-05 PROCEDURE — 36415 COLL VENOUS BLD VENIPUNCTURE: CPT

## 2020-09-05 PROCEDURE — 84443 ASSAY THYROID STIM HORMONE: CPT

## 2020-09-05 PROCEDURE — 82306 VITAMIN D 25 HYDROXY: CPT

## 2020-09-07 LAB — 25(OH)D3 SERPL-MCNC: 42.8 NG/ML (ref 30–100)

## 2020-09-11 ENCOUNTER — TELEPHONE (OUTPATIENT)
Dept: INTERNAL MEDICINE CLINIC | Facility: CLINIC | Age: 50
End: 2020-09-11

## 2020-09-11 NOTE — TELEPHONE ENCOUNTER
Pt. Is calling to see if she can do a cologuard or Occult stool test in lieu of a colonoscopy due to COVID19 ph.  # [phone] ok to leave a message  Routed to clinical

## 2020-09-11 NOTE — TELEPHONE ENCOUNTER
To Dr. Ct Sloan to please advise if patient can do Cologuard or occult stool testing instead of colonoscopy. Pt aware Dr. Johanna Lopez is back Tuesday, pt verbalized OK to wait. Pt stated OK to leave a detailed message on her VM if she does not answer.   Colonoscopy

## 2020-09-15 NOTE — TELEPHONE ENCOUNTER
Ramakrishna.   Please fill out and I'll sign, then mail to pt, as she needs to fill out her portion then fax to company

## 2020-09-15 NOTE — TELEPHONE ENCOUNTER
Form completed and mailed to patient with face sheet. Pt needs to sign and date. Can either fax in or mail in herself or bring back to the office. Left message to call back.

## 2020-09-16 NOTE — TELEPHONE ENCOUNTER
Pt called back with questions. Advised that form was mailed out yesterday.  Went over process with patient--verbalized understanding

## 2021-10-13 ENCOUNTER — TELEPHONE (OUTPATIENT)
Dept: INTERNAL MEDICINE CLINIC | Facility: CLINIC | Age: 51
End: 2021-10-13

## 2021-10-13 DIAGNOSIS — M79.672 PAIN IN BOTH FEET: Primary | ICD-10-CM

## 2021-10-13 DIAGNOSIS — M79.671 PAIN IN BOTH FEET: Primary | ICD-10-CM

## 2021-10-13 NOTE — TELEPHONE ENCOUNTER
Pt requesting referral for appt 1/3/21 with Dr Rosalva Lopez and Ankle for new orthotics  Please notify patient when this is complete  Tasked to nursing

## 2021-10-14 NOTE — TELEPHONE ENCOUNTER
To [facility]---    Referral placed per MD protocol. Are you able to assist with auth?

## 2021-10-15 NOTE — TELEPHONE ENCOUNTER
DINHB for pt to inquire date on her appt with Dr. Candelaria Mccabe.  Is it for a future appointment or to back date a referral?

## 2021-10-15 NOTE — TELEPHONE ENCOUNTER
Good Afternoon Dr Kathleen Marsh and staff,    Just to confirm, patient is asking us to backdate referral for 1/3/21 OV with Dr Julio Wallace? [facility] can not back date a referral back to January. Please confirm patients date of office visit.     Thank you,

## 2021-11-02 NOTE — TELEPHONE ENCOUNTER
Pt returned call   She has a follow up appt with Dr Jose Jones on 1/3/22  This appt is to be fitted for orthotics  Tasked to nursing

## 2021-11-23 ENCOUNTER — OFFICE VISIT (OUTPATIENT)
Dept: INTERNAL MEDICINE CLINIC | Facility: CLINIC | Age: 51
End: 2021-11-23
Payer: COMMERCIAL

## 2021-11-23 VITALS
TEMPERATURE: 98 F | HEIGHT: 67 IN | WEIGHT: 113 LBS | DIASTOLIC BLOOD PRESSURE: 84 MMHG | HEART RATE: 92 BPM | SYSTOLIC BLOOD PRESSURE: 110 MMHG | OXYGEN SATURATION: 99 % | BODY MASS INDEX: 17.74 KG/M2

## 2021-11-23 DIAGNOSIS — M85.89 OSTEOPENIA OF MULTIPLE SITES: ICD-10-CM

## 2021-11-23 DIAGNOSIS — Z00.00 ROUTINE HEALTH MAINTENANCE: ICD-10-CM

## 2021-11-23 DIAGNOSIS — Z12.4 SCREENING FOR CERVICAL CANCER: Primary | ICD-10-CM

## 2021-11-23 DIAGNOSIS — R42 LIGHTHEADEDNESS: ICD-10-CM

## 2021-11-23 DIAGNOSIS — Z12.31 ENCOUNTER FOR SCREENING MAMMOGRAM FOR MALIGNANT NEOPLASM OF BREAST: ICD-10-CM

## 2021-11-23 PROCEDURE — 99396 PREV VISIT EST AGE 40-64: CPT | Performed by: INTERNAL MEDICINE

## 2021-11-23 PROCEDURE — 3008F BODY MASS INDEX DOCD: CPT | Performed by: INTERNAL MEDICINE

## 2021-11-23 PROCEDURE — 3074F SYST BP LT 130 MM HG: CPT | Performed by: INTERNAL MEDICINE

## 2021-11-23 PROCEDURE — 3079F DIAST BP 80-89 MM HG: CPT | Performed by: INTERNAL MEDICINE

## 2021-11-23 PROCEDURE — 90686 IIV4 VACC NO PRSV 0.5 ML IM: CPT | Performed by: INTERNAL MEDICINE

## 2021-11-23 PROCEDURE — 90471 IMMUNIZATION ADMIN: CPT | Performed by: INTERNAL MEDICINE

## 2021-11-23 RX ORDER — SPIRONOLACTONE 25 MG
1 TABLET ORAL DAILY
COMMUNITY

## 2021-11-23 RX ORDER — ASCORBIC ACID 1000 MG
250 TABLET ORAL DAILY
COMMUNITY

## 2021-11-23 RX ORDER — MULTIVIT-MIN/IRON/FOLIC ACID/K 18-600-40
1 CAPSULE ORAL DAILY
COMMUNITY
End: 2021-11-23

## 2021-11-23 RX ORDER — RIBOFLAVIN (VITAMIN B2) 100 MG
100 TABLET ORAL DAILY
COMMUNITY

## 2021-11-23 RX ORDER — IBUPROFEN 200 MG
200 TABLET ORAL EVERY 6 HOURS PRN
COMMUNITY

## 2021-11-23 NOTE — PROGRESS NOTES
Yamileth Daniels is a 45year old female. Patient presents with:  Physical: She asks if she is due for a pap today. Does not see OBGYN. Declines flu shot. She had her Covid vaccines. Mom passed recently in May.        HPI:   Yamileth Daniels is a 45year old fem Maternal Grandmother    • Migraines Sister    • Heart Disease Other         family h/o CAD   • Lipids Other    • Allergies Cousin    • Asthma Cousin    • Breast Cancer Paternal Aunt    • Depression Mother    • Depression Sister    • Pulmonary Disease Pater Pfizer covid vaccines x 2. Had negative cologuard in 10/2020 -- next in 2023. Flu shot today 11/23/21.   Encouraged exercise, weight training.     Osteopenia  Pt postmenopausal.  DEXA with mild osteopenia in 12/2017 -- cont calcium w/D, encouraged weight

## 2021-12-29 NOTE — TELEPHONE ENCOUNTER
Patient is calling today to request a referral for Dr Eloisa Terrazas. There seems to be a pending referral from October. Patient has an upcoming appointment with Dr Eloisa Terrazas on 1/3/2022.  #[phone], please call patient when completed.

## 2021-12-29 NOTE — TELEPHONE ENCOUNTER
To [facility] - there is pending referral for DR. Kelby Jo form October 2021   Please inform patient and clinical staff if anything else is needed

## 2022-01-03 NOTE — TELEPHONE ENCOUNTER
Yulisa/[facility] called to check status on referral for  appt today, Arnav 3, 2022  at 2:20 pm    Spoke with Angel Guaman at [facility]  She is looking into the referral for today's appt  Relayed this to [facility] NICOLLE / Dr Moon Schofield

## 2022-01-03 NOTE — TELEPHONE ENCOUNTER
[facility] & relayed Amita's message as follow Amita Guerrero, referral [id number] for Vijay Michel has been authorized and faxed over to Dr Tess Zamudio office at fax# [phone]

## 2022-01-28 ENCOUNTER — LAB ENCOUNTER (OUTPATIENT)
Dept: LAB | Age: 52
End: 2022-01-28
Attending: INTERNAL MEDICINE
Payer: COMMERCIAL

## 2022-01-28 DIAGNOSIS — Z00.00 ROUTINE HEALTH MAINTENANCE: ICD-10-CM

## 2022-01-28 DIAGNOSIS — R42 LIGHTHEADEDNESS: ICD-10-CM

## 2022-01-28 LAB
ALBUMIN SERPL-MCNC: 4.4 G/DL (ref 3.4–5)
ALBUMIN/GLOB SERPL: 1.8 {RATIO} (ref 1–2)
ALP LIVER SERPL-CCNC: 67 U/L
ALT SERPL-CCNC: 33 U/L
ANION GAP SERPL CALC-SCNC: 5 MMOL/L (ref 0–18)
AST SERPL-CCNC: 30 U/L (ref 15–37)
BASOPHILS # BLD AUTO: 0.07 X10(3) UL (ref 0–0.2)
BASOPHILS NFR BLD AUTO: 1.5 %
BILIRUB SERPL-MCNC: 0.7 MG/DL (ref 0.1–2)
BUN BLD-MCNC: 16 MG/DL (ref 7–18)
BUN/CREAT SERPL: 19.3 (ref 10–20)
CALCIUM BLD-MCNC: 9.7 MG/DL (ref 8.5–10.1)
CHLORIDE SERPL-SCNC: 106 MMOL/L (ref 98–112)
CHOLEST SERPL-MCNC: 179 MG/DL (ref ?–200)
CO2 SERPL-SCNC: 30 MMOL/L (ref 21–32)
CREAT BLD-MCNC: 0.83 MG/DL
DEPRECATED RDW RBC AUTO: 36.5 FL (ref 35.1–46.3)
EOSINOPHIL # BLD AUTO: 0.11 X10(3) UL (ref 0–0.7)
EOSINOPHIL NFR BLD AUTO: 2.3 %
ERYTHROCYTE [DISTWIDTH] IN BLOOD BY AUTOMATED COUNT: 10.8 % (ref 11–15)
FASTING PATIENT LIPID ANSWER: YES
FASTING STATUS PATIENT QL REPORTED: YES
GLOBULIN PLAS-MCNC: 2.4 G/DL (ref 2.8–4.4)
GLUCOSE BLD-MCNC: 101 MG/DL (ref 70–99)
HCT VFR BLD AUTO: 45.6 %
HDLC SERPL-MCNC: 81 MG/DL (ref 40–59)
HGB BLD-MCNC: 15 G/DL
IMM GRANULOCYTES # BLD AUTO: 0.01 X10(3) UL (ref 0–1)
IMM GRANULOCYTES NFR BLD: 0.2 %
LDLC SERPL CALC-MCNC: 89 MG/DL (ref ?–100)
LYMPHOCYTES # BLD AUTO: 1.11 X10(3) UL (ref 1–4)
LYMPHOCYTES NFR BLD AUTO: 23.5 %
MCH RBC QN AUTO: 30.1 PG (ref 26–34)
MCHC RBC AUTO-ENTMCNC: 32.9 G/DL (ref 31–37)
MCV RBC AUTO: 91.4 FL
MONOCYTES # BLD AUTO: 0.4 X10(3) UL (ref 0.1–1)
MONOCYTES NFR BLD AUTO: 8.5 %
NEUTROPHILS # BLD AUTO: 3.02 X10 (3) UL (ref 1.5–7.7)
NEUTROPHILS # BLD AUTO: 3.02 X10(3) UL (ref 1.5–7.7)
NEUTROPHILS NFR BLD AUTO: 64 %
NONHDLC SERPL-MCNC: 98 MG/DL (ref ?–130)
OSMOLALITY SERPL CALC.SUM OF ELEC: 293 MOSM/KG (ref 275–295)
PLATELET # BLD AUTO: 222 10(3)UL (ref 150–450)
POTASSIUM SERPL-SCNC: 4.2 MMOL/L (ref 3.5–5.1)
PROT SERPL-MCNC: 6.8 G/DL (ref 6.4–8.2)
SODIUM SERPL-SCNC: 141 MMOL/L (ref 136–145)
TRIGL SERPL-MCNC: 45 MG/DL (ref 30–149)
TSI SER-ACNC: 1.48 MIU/ML (ref 0.36–3.74)
VIT D+METAB SERPL-MCNC: 54 NG/ML (ref 30–100)
VLDLC SERPL CALC-MCNC: 7 MG/DL (ref 0–30)
WBC # BLD AUTO: 4.7 X10(3) UL (ref 4–11)

## 2022-01-28 PROCEDURE — 80053 COMPREHEN METABOLIC PANEL: CPT

## 2022-01-28 PROCEDURE — 84443 ASSAY THYROID STIM HORMONE: CPT | Performed by: INTERNAL MEDICINE

## 2022-01-28 PROCEDURE — 80061 LIPID PANEL: CPT

## 2022-01-28 PROCEDURE — 82306 VITAMIN D 25 HYDROXY: CPT

## 2022-01-28 PROCEDURE — 36415 COLL VENOUS BLD VENIPUNCTURE: CPT | Performed by: INTERNAL MEDICINE

## 2022-01-28 PROCEDURE — 85025 COMPLETE CBC W/AUTO DIFF WBC: CPT

## 2022-02-02 ENCOUNTER — PATIENT MESSAGE (OUTPATIENT)
Dept: INTERNAL MEDICINE CLINIC | Facility: CLINIC | Age: 52
End: 2022-02-02

## 2022-03-19 ENCOUNTER — PATIENT MESSAGE (OUTPATIENT)
Dept: INTERNAL MEDICINE CLINIC | Facility: CLINIC | Age: 52
End: 2022-03-19

## 2022-03-21 NOTE — TELEPHONE ENCOUNTER
From: Annamarie Uribe  To: Janice Celis MD  Sent: 3/19/2022 1:51 PM CDT  Subject: Eye twitching    Dr. Brittany Wu! Close to two weeks ago I began experiencing left-eye twitching multiple times a day. I have not increased my caffeine intake, nor do I feel particularly stressed. Prior to this I only had eye twitching once in a great while so I am a little bit concerned, especially in light of a family history of Parkinson's Disease (my maternal grandmother). One other question that I have is whether or not there is a blood test (or other test) that can be taken to determine my risk for developing Alzheimer's Disease? Thanks so much for your help. I hope you're doing well!   Edward Mcmullen

## 2022-04-21 ENCOUNTER — TELEPHONE (OUTPATIENT)
Dept: INTERNAL MEDICINE CLINIC | Facility: CLINIC | Age: 52
End: 2022-04-21

## 2022-04-21 NOTE — TELEPHONE ENCOUNTER
Please call patient, she is not feeling well  Feeling achy, dizzy, fatigue, hot and cold  Started mostly this morning, no fever  Pt will take home COVID test  Tasked to nursing

## 2022-04-21 NOTE — TELEPHONE ENCOUNTER
ARABELLAI to Dr. Ramya Montero----    Spoke to pt who reports her home covid test was + this AM. Reports yesterday developed fatigue and body aches. Today states nasal congestion, sneezing, chills/sweats, dizziness. Was exposed on Sunday to + [employer]. Denies fever, breathing issues, sore throat, loss of smell/taste, conjunctivitis, nausea/vomiting/diarrhea, cough. Spoke to pt regarding CDC guidelines on quarantine and home precautions, when to present to ER, vital sign monitoring, OTC medications and to call if symptoms worsen/persist. She verbalized understanding.

## 2022-06-01 ENCOUNTER — TELEPHONE (OUTPATIENT)
Dept: INTERNAL MEDICINE CLINIC | Facility: CLINIC | Age: 52
End: 2022-06-01

## 2022-06-01 NOTE — TELEPHONE ENCOUNTER
Pt is asking for a prescription for flonase nasal spray/or anything Dr Brittany Hines would recommend to help with snoring     Call back # [phone]

## 2022-06-02 RX ORDER — FLUTICASONE PROPIONATE 50 MCG
2 SPRAY, SUSPENSION (ML) NASAL NIGHTLY
Qty: 1 EACH | Refills: 3 | Status: SHIPPED | OUTPATIENT
Start: 2022-06-02

## 2022-08-11 ENCOUNTER — TELEPHONE (OUTPATIENT)
Dept: INTERNAL MEDICINE CLINIC | Facility: CLINIC | Age: 52
End: 2022-08-11

## 2022-08-11 NOTE — TELEPHONE ENCOUNTER
Patient is calling she is scheduled for a [facility] on 9/4. Patient is asking how long after she gets the booster will she start experiencing side affects if she is going to?     Phone [phone] okay to leave voicemail

## 2022-08-11 NOTE — TELEPHONE ENCOUNTER
In response to your question regarding COVID vaccine reactions we will share some information from the CDC. Some patients will have mild reactions to the vaccine. Your arm may be sore, red, or warm to the touch. These symptoms usually go away on their own within a week. Some people report getting a fatigue, nausea, headache or fever when getting a vaccine. These side effects are a sign that your immune system is doing exactly what it is supposed to do. It is working and building up protection to disease. If symptoms last longer than 48-72 hours and are not relieved by over the counter products, please contact the office. If you get a COVID-19 vaccine and you think you might be having a severe allergic reaction after leaving the vaccination site, seek immediate medical care by calling [phone] with patient and relayed above message. Patient verbalized an understanding. Patient notified to call back with unusual or worsening symptoms or go to ED-- she verbalized an understanding to this. [employer] message also sent with this information.

## 2022-08-22 ENCOUNTER — HOSPITAL ENCOUNTER (OUTPATIENT)
Dept: MAMMOGRAPHY | Age: 52
Discharge: HOME | End: 2022-08-22
Attending: INTERNAL MEDICINE
Payer: COMMERCIAL

## 2022-08-22 DIAGNOSIS — Z12.31 ENCOUNTER FOR SCREENING MAMMOGRAM FOR MALIGNANT NEOPLASM OF BREAST: ICD-10-CM

## 2022-08-22 PROCEDURE — 77067 SCR MAMMO BI INCL CAD: CPT | Performed by: INTERNAL MEDICINE

## 2022-08-22 PROCEDURE — 77063 BREAST TOMOSYNTHESIS BI: CPT | Performed by: INTERNAL MEDICINE

## 2022-09-24 ENCOUNTER — IMMUNIZATION (OUTPATIENT)
Dept: LAB | Age: 52
End: 2022-09-24
Attending: EMERGENCY MEDICINE

## 2022-09-24 DIAGNOSIS — Z23 NEED FOR VACCINATION: Primary | ICD-10-CM

## 2022-09-24 PROCEDURE — 0134A SARSCOV2 VAC BVL 50MCG/0.5ML: CPT

## 2022-12-05 ENCOUNTER — TELEPHONE (OUTPATIENT)
Dept: INTERNAL MEDICINE CLINIC | Facility: CLINIC | Age: 52
End: 2022-12-05

## 2022-12-05 DIAGNOSIS — H26.9 CATARACT, UNSPECIFIED CATARACT TYPE, UNSPECIFIED LATERALITY: Primary | ICD-10-CM

## 2022-12-05 NOTE — TELEPHONE ENCOUNTER
Pt left voicemail message requesting referral to schedule appt with opthamologist, Dr Yuridia Mondragon  Pt has cataract that is beginning to bother her, would like to have it checked out  Please call patient to discuss or with any questions  Tasked to nursing

## 2022-12-13 ENCOUNTER — TELEPHONE (OUTPATIENT)
Dept: INTERNAL MEDICINE CLINIC | Facility: CLINIC | Age: 52
End: 2022-12-13

## 2022-12-13 NOTE — TELEPHONE ENCOUNTER
Respiratory infection triage:    Fever:  [x]  No fever   []  Fever>100.4  [] Chills  [] Night Sweats  [x] Body Aches    Cough:  [] Tight cough  [] Cough with exertion  [] Dry cough  [x] Sputum production, Color: yellowish    Breathing:  [] Shortness of breath with exertion   [] Shortness of breath at rest  [] Heavy breathing  [] Wheezing  [] Pain with deep breathing  [] Using inhaler    GI Symptoms:  [] Nausea   [] Vomiting   [] Diarrhea   [] Poor appetite     Other Symptoms:  [] Sore throat  [] Difficulty swallowing   [x] Nasal drainage/congestion  [x] Sinus congestion/pressure  [x] Headache  [x] Fatigue   [] Weakness  [] Ear pain  [] Loss of sense of smell   [] Loss of sense of taste  []Conjunctivitis? Positive COVID Exposure (<6 feet, >15 mins. no mask)  [] Yes  [x] No  Date of last contact:     [x] Any sick contacts? Pts son, tested negative    Vaccinated [x] Yes  [] No  Booster [x] Yes  [] No     [] Any recent travel? No    [x] Home Covid Test    Result: 12/4 - Positive    OTC Medications: Mucinex, dayquil, nyquil. ADDITIONAL NOTES:  States yesterday morning her symptoms above came back. Denies covid testing. States she is starting to feel better already but is looking for MD recommendation. Notified patient that we will route this message to the doctor and see what their recommendations would be. In the meantime, if anything worsens, they were advised to call back or seek emergent evaluation.

## 2023-01-03 NOTE — TELEPHONE ENCOUNTER
To  to please call patient to schedule annual physical and then route back to rx. Last seen November 2021. Thanks!

## 2023-01-04 ENCOUNTER — TELEPHONE (OUTPATIENT)
Dept: INTERNAL MEDICINE CLINIC | Facility: CLINIC | Age: 53
End: 2023-01-04

## 2023-01-04 DIAGNOSIS — H52.10 SEVERE MYOPIA, UNSPECIFIED LATERALITY: ICD-10-CM

## 2023-01-04 DIAGNOSIS — H52.13 SEVERE MYOPIA OF BOTH EYES: ICD-10-CM

## 2023-01-04 NOTE — TELEPHONE ENCOUNTER
Jose Elias Carpenter from [address] [place] [address] Ophthalmology called. The pt. Saw Dr. Stan Williamson yesterday and he wants to send her to a Retina Specialist for High Myopia. She is asking us to generate the referral.  Pt. Has [facility] HMO.    Jose Elias Carpenter can be reached at [phone]

## 2023-01-04 NOTE — TELEPHONE ENCOUNTER
Left voicemail for patient to provide us with the name of a retina specialist in her insurance plan so that we can order a referral.

## 2023-01-05 RX ORDER — FLUTICASONE PROPIONATE 50 MCG
SPRAY, SUSPENSION (ML) NASAL
Qty: 1 EACH | Refills: 0 | Status: SHIPPED | OUTPATIENT
Start: 2023-01-05

## 2023-01-05 RX ORDER — FLUTICASONE PROPIONATE 50 MCG
SPRAY, SUSPENSION (ML) NASAL
Qty: 48 G | Refills: 0 | OUTPATIENT
Start: 2023-01-05

## 2023-01-05 NOTE — TELEPHONE ENCOUNTER
Pt returned call, provided name of retina specialist:   Dr Taj Calero  Tasked to nursing to order referral

## 2023-01-05 NOTE — TELEPHONE ENCOUNTER
Pended referral as requested and approved my patients insurance for retina specialist, pended and awaiting MD review and signature if in agreement.

## 2023-01-18 ENCOUNTER — OFFICE VISIT (OUTPATIENT)
Dept: INTERNAL MEDICINE CLINIC | Facility: CLINIC | Age: 53
End: 2023-01-18

## 2023-01-18 VITALS
OXYGEN SATURATION: 97 % | SYSTOLIC BLOOD PRESSURE: 112 MMHG | DIASTOLIC BLOOD PRESSURE: 72 MMHG | WEIGHT: 112 LBS | BODY MASS INDEX: 17.58 KG/M2 | TEMPERATURE: 98 F | HEART RATE: 97 BPM | HEIGHT: 67 IN

## 2023-01-18 DIAGNOSIS — M85.89 OSTEOPENIA OF MULTIPLE SITES: ICD-10-CM

## 2023-01-18 DIAGNOSIS — M85.80 OSTEOPENIA, UNSPECIFIED LOCATION: Primary | ICD-10-CM

## 2023-01-18 DIAGNOSIS — Z00.00 ROUTINE HEALTH MAINTENANCE: ICD-10-CM

## 2023-01-18 DIAGNOSIS — R01.1 MURMUR, CARDIAC: ICD-10-CM

## 2023-01-18 PROCEDURE — 90686 IIV4 VACC NO PRSV 0.5 ML IM: CPT | Performed by: INTERNAL MEDICINE

## 2023-01-18 PROCEDURE — 99396 PREV VISIT EST AGE 40-64: CPT | Performed by: INTERNAL MEDICINE

## 2023-01-18 PROCEDURE — 3074F SYST BP LT 130 MM HG: CPT | Performed by: INTERNAL MEDICINE

## 2023-01-18 PROCEDURE — 3078F DIAST BP <80 MM HG: CPT | Performed by: INTERNAL MEDICINE

## 2023-01-18 PROCEDURE — 90471 IMMUNIZATION ADMIN: CPT | Performed by: INTERNAL MEDICINE

## 2023-01-18 PROCEDURE — 3008F BODY MASS INDEX DOCD: CPT | Performed by: INTERNAL MEDICINE

## 2023-01-18 RX ORDER — MULTIVITAMIN WITH IRON
250 TABLET ORAL DAILY
COMMUNITY

## 2023-02-08 ENCOUNTER — MED REC SCAN ONLY (OUTPATIENT)
Dept: INTERNAL MEDICINE CLINIC | Facility: CLINIC | Age: 53
End: 2023-02-08

## 2023-03-07 RX ORDER — FLUTICASONE PROPIONATE 50 MCG
SPRAY, SUSPENSION (ML) NASAL
Qty: 3 EACH | Refills: 3 | Status: SHIPPED | OUTPATIENT
Start: 2023-03-07

## 2023-03-25 ENCOUNTER — HOSPITAL ENCOUNTER (OUTPATIENT)
Dept: CV DIAGNOSTICS | Facility: HOSPITAL | Age: 53
Discharge: HOME | End: 2023-03-25
Attending: INTERNAL MEDICINE
Payer: COMMERCIAL

## 2023-03-25 DIAGNOSIS — R01.1 MURMUR, CARDIAC: ICD-10-CM

## 2023-03-25 PROCEDURE — 93306 TTE W/DOPPLER COMPLETE: CPT | Performed by: INTERNAL MEDICINE

## 2023-04-08 ENCOUNTER — HOSPITAL ENCOUNTER (OUTPATIENT)
Dept: BONE DENSITY | Age: 53
Discharge: HOME | End: 2023-04-08
Attending: INTERNAL MEDICINE
Payer: COMMERCIAL

## 2023-04-08 DIAGNOSIS — M85.80 OSTEOPENIA, UNSPECIFIED LOCATION: ICD-10-CM

## 2023-04-08 PROCEDURE — 77080 DXA BONE DENSITY AXIAL: CPT | Performed by: INTERNAL MEDICINE

## 2023-06-02 ENCOUNTER — TELEPHONE (OUTPATIENT)
Dept: INTERNAL MEDICINE CLINIC | Facility: CLINIC | Age: 53
End: 2023-06-02

## 2023-06-02 DIAGNOSIS — Z71.89 ENCOUNTER FOR FAMILY COUNSELING: Primary | ICD-10-CM

## 2023-06-02 NOTE — TELEPHONE ENCOUNTER
Patient requesting referral for appt to see:  Counselor: Dilshad Maciel  Practice name:  [facility]  Telephone: [phone]  FAX#:  [phone]  Patient has appt scheduled for 6/13/23    Please call patient with any questions  Tasked to nursing

## 2023-06-02 NOTE — TELEPHONE ENCOUNTER
Left message to call back.    What will she be seeing therapist for - let her know referrals take 10-15 days to be authorized

## 2023-06-02 NOTE — TELEPHONE ENCOUNTER
Please advise -referral pending - seeing therapist for family issues - informed regarding referral process - needs to go to centralized [facility] - to DR. Shannon Tolbert

## 2023-09-28 ENCOUNTER — TELEPHONE (OUTPATIENT)
Dept: INTERNAL MEDICINE CLINIC | Facility: CLINIC | Age: 53
End: 2023-09-28

## 2023-09-28 DIAGNOSIS — M79.671 PAIN IN BOTH FEET: Primary | ICD-10-CM

## 2023-09-28 DIAGNOSIS — M79.672 PAIN IN BOTH FEET: Primary | ICD-10-CM

## 2023-09-28 DIAGNOSIS — Z46.89 ENCOUNTER FOR EVALUATION OF ORTHOTIC DEVICE: ICD-10-CM

## 2023-09-28 DIAGNOSIS — L98.499 CALLOUS ULCER, UNSPECIFIED ULCER STAGE (HCC): ICD-10-CM

## 2023-09-28 NOTE — TELEPHONE ENCOUNTER
Pt called to request referral for     Dr Kalia Pulliam at [address] [facility]    Pt has an appointment in the [place] office on Nov 3 - for orthotics & callous follow up   Code      Please call pt to confirm authorization

## 2023-09-28 NOTE — TELEPHONE ENCOUNTER
Patient called and left detailed message stating referral was placed and faxed to Dr REAVES [facility] office. Patient to call back office for any further questions.

## 2023-11-03 ENCOUNTER — LAB REQUISITION (OUTPATIENT)
Dept: LAB | Facility: HOSPITAL | Age: 53
End: 2023-11-03
Payer: COMMERCIAL

## 2023-11-03 DIAGNOSIS — Z01.89 ENCOUNTER FOR OTHER SPECIFIED SPECIAL EXAMINATIONS: ICD-10-CM

## 2023-11-03 PROCEDURE — 87101 SKIN FUNGI CULTURE: CPT | Performed by: PODIATRIST

## 2023-11-20 ENCOUNTER — TELEPHONE (OUTPATIENT)
Dept: INTERNAL MEDICINE CLINIC | Facility: CLINIC | Age: 53
End: 2023-11-20

## 2023-11-20 NOTE — TELEPHONE ENCOUNTER
URI Triage:     Fever: Yes[]               No[x]                 Unknown[]  [] Temperature:   [] Chills   [] Night sweats  [] Body aches     Cough: Yes[x]              No[]  [x] Productive cough   [] Cough with exertion  [] Dry cough     Respiratory Symptoms: Yes[]          No[x]  [] Wheezing  [] Pain with deep breathing  [] SOB with exertion  [] SOB at rest  [] Heavy breathing  [] Chest discomfort with deep breathing or coughing       Other symptoms:  [] Sore throat  [] Difficulty swallowing  [] Sinus pressure  [x] Nasal drainage  [x] Nasal congestion  [x] Chest congestion  [] Head congestion  [x] PND  [] Facial pain   [] Ear pain  [] Conjunctivitis  [] Headache  [x] Fatigue  [x] Weakness  [] Loss of sense of smell   [] Loss of sense of taste     [x]OTC Medications:  Mucinex   Ibuprofen   Flonase      Symptom onset:  2 wks ago      To Dr. Robina Nielsen:  Did not test for covid, but states she has had a recent covid infection within the last 3 mo. Offered clinic visit tomorrow or Wednesday. Pt unable to make d/t scheduling conflicts. Pt just looking for advice-- should she just continue to wait it out and use OTCs? Please advise.

## 2023-11-20 NOTE — TELEPHONE ENCOUNTER
Patient is calling for the last 2 weeks she has had a wet cough. Patient has been taking Mucinex it is helping some but she is asking if there is something else she could take?     Phone [phone] okay to leave detailed message if she doesn't answer

## 2023-11-24 ENCOUNTER — APPOINTMENT (OUTPATIENT)
Dept: GENERAL RADIOLOGY | Age: 53
End: 2023-11-24
Attending: PHYSICIAN ASSISTANT
Payer: COMMERCIAL

## 2023-11-24 ENCOUNTER — HOSPITAL ENCOUNTER (OUTPATIENT)
Age: 53
Discharge: HOME | End: 2023-11-24
Payer: COMMERCIAL

## 2023-11-24 VITALS
RESPIRATION RATE: 20 BRPM | SYSTOLIC BLOOD PRESSURE: 112 MMHG | TEMPERATURE: 98 F | HEART RATE: 96 BPM | DIASTOLIC BLOOD PRESSURE: 85 MMHG | OXYGEN SATURATION: 96 %

## 2023-11-24 DIAGNOSIS — R05.1 ACUTE COUGH: Primary | ICD-10-CM

## 2023-11-24 PROCEDURE — 99204 OFFICE O/P NEW MOD 45 MIN: CPT

## 2023-11-24 PROCEDURE — 99213 OFFICE O/P EST LOW 20 MIN: CPT

## 2023-11-24 PROCEDURE — 71046 X-RAY EXAM CHEST 2 VIEWS: CPT | Performed by: PHYSICIAN ASSISTANT

## 2023-11-24 RX ORDER — BENZONATATE 100 MG/1
100 CAPSULE ORAL 3 TIMES DAILY PRN
Qty: 30 CAPSULE | Refills: 0 | Status: SHIPPED | OUTPATIENT
Start: 2023-11-24 | End: 2023-12-24

## 2023-11-24 RX ORDER — ALBUTEROL SULFATE 90 UG/1
2 AEROSOL, METERED RESPIRATORY (INHALATION) EVERY 4 HOURS PRN
Qty: 1 EACH | Refills: 0 | Status: SHIPPED | OUTPATIENT
Start: 2023-11-24 | End: 2023-12-24

## 2023-11-24 RX ORDER — CODEINE PHOSPHATE AND GUAIFENESIN 10; 100 MG/5ML; MG/5ML
5 SOLUTION ORAL EVERY 6 HOURS PRN
Qty: 50 ML | Refills: 0 | Status: SHIPPED | OUTPATIENT
Start: 2023-11-24

## 2023-11-24 NOTE — ED INITIAL ASSESSMENT (HPI)
Patient arrived ambulatory to room c/o symptoms that started 2 weeks ago. +productive cough +nasal congestion. No fevers. No n/v. Slight diarrhea. Patient has had covid within the last 3 months. Easy non labored respirations.

## 2023-11-29 ENCOUNTER — TELEPHONE (OUTPATIENT)
Dept: INTERNAL MEDICINE CLINIC | Facility: CLINIC | Age: 53
End: 2023-11-29

## 2023-11-29 NOTE — TELEPHONE ENCOUNTER
I do not think she needs to see a pulmonologist.     She has what sounds like a viral URI. As Yvonne Victor stated, there are lots of viruses going around now, and they can last for several weeks. Continue with symptomatic treatment.   Happy to see her if not improving by next week

## 2023-11-29 NOTE — TELEPHONE ENCOUNTER
Pt was diagnosed with bronchitis at   Requests call back to discuss medications that were prescribed, should she be taking anything else, should see a pulmonologist     Pt's cough has subsided but now she is congested, headache (may be caffeine withdrawal as pt is not drinking coffee)  pt also feels really wiped out, lung capacity is decreased/feels short of breath     [phone]

## 2023-11-29 NOTE — TELEPHONE ENCOUNTER
To Tessy Paul to pt who reports she was in [facility] 11/24. Reports she had symptoms prior to UC visit x2 weeks with worsening cough. Reports since UC visit, her cough has improved, but she is noting nasal congestion and fatigue. Also c/o mild sob with exertion (reports not severe, but different than her baseline). Also c/o muscle aches from the coughing and headache, but feels that this is due to not drinking coffee. Pt has been taking albuterol, tessalon pearles. Also taking aleve, flonase. She only took one dose of cheratussin and did not like it. She will add mucinex and otc cetirizine. She denies any fever, breathing difficulties, sore throat. Cxr was completed in UC. Pt had covid recently and UC advised no need to retest.     Reports in total, symptoms have been ongoing x2.5 weeks. Pt does not wish to be seen. She reports overall she is feeling better and wishes to try OTC medications until weekend and will call if no improvement or worsening symptoms. Pt is inquiring if she should see a pulmonologist, as this is the second time she's had a respiratory illness (reports pna x1 in the past). RN did advise that many respiratory viruses are going around right now.  Pt reports her  wishes to know if she should see specialist and wanted message sent to MD.

## 2023-11-29 NOTE — TELEPHONE ENCOUNTER
Spoke with patient and relayed Dr. Jorge Davenport' message. Patient verbalized an understanding to all information and will follow up accordingly.

## 2024-02-26 ENCOUNTER — TELEPHONE (OUTPATIENT)
Dept: INTERNAL MEDICINE CLINIC | Facility: CLINIC | Age: 54
End: 2024-02-26

## 2024-02-26 DIAGNOSIS — K52.9 GASTROENTERITIS: Primary | ICD-10-CM

## 2024-02-26 NOTE — TELEPHONE ENCOUNTER
Patient's spouse is calling yesterday he developed diarrhea, vomiting, body aches, sharp stomach pain, tired, light headed & feverish..  Symptoms lasted all day & night   Today she feels wiped out.  Spouse Tian is also sick with the same issues    Please call Tian [phone]    Message put in for spouse

## 2024-02-26 NOTE — TELEPHONE ENCOUNTER
Unknown what the hand likely a foodborne pathogen of some type, they are certainly welcome to use Imodium, send in some Zofran for them, and symptomatically have them hydrate well, a good probiotic twice a day, and hopefully there symptoms recover with conservative management if something is getting worse that should let us know.  Nursing send her in Zofran if needed.

## 2024-02-26 NOTE — TELEPHONE ENCOUNTER
To Dr. REYNOLDS:  Please advise in absence of Dr. Fabian.     Spoke with patient's , Tian (Okay per HIPAA).  She developed diarrhea and vomiting yesterday morning.  Episodes of vomiting and diarrhea intermittently throughout the day yesterday; no episodes today.  No bloody or black/tarry stools.  Reporting frequent stomach rumbling, some intermittent cramps.  Reporting body aches, fatigue, and intermittent lightheadedness. Afebrile.   Tolerating fluids today and adding electrolyte packets.   Able to tolerate banana this morning.  Taking pepto bismol prn.  Reports they had take-out Cod fish dinners Sat night -- he has the same symptoms.     Advised fluids as tolerated and BRAT diet as tolerated.     Please advise.     (There is also a message for patient's  -- Tian Tanner)      Advised Tian call back with any questions/concerns/worsening symptoms.   ER precautions reviewed with Tian who verbalized understanding.

## 2024-02-29 RX ORDER — ONDANSETRON 4 MG/1
4 TABLET, ORALLY DISINTEGRATING ORAL EVERY 6 HOURS PRN
COMMUNITY
Start: 2024-02-29

## 2024-03-04 NOTE — TELEPHONE ENCOUNTER
Pt response noted--sx resolved.     Evelin Felix Im [facility] Clinical Staff (supporting You)3 hours ago (10:52 AM)     Hello -     Thank you for your follow up! The bug lasted less than 48 hours, thankfully. Not sure if it was a stomach bug or food poisoning, but I guess it doesn't really matter now that it's resolved!     Enjoy your day,  Evelin

## 2024-03-06 ENCOUNTER — TELEPHONE (OUTPATIENT)
Dept: INTERNAL MEDICINE CLINIC | Facility: CLINIC | Age: 54
End: 2024-03-06

## 2024-03-06 DIAGNOSIS — M79.672 PAIN IN BOTH FEET: Primary | ICD-10-CM

## 2024-03-06 DIAGNOSIS — L98.499 CALLOUS ULCER, UNSPECIFIED ULCER STAGE (HCC): ICD-10-CM

## 2024-03-06 DIAGNOSIS — M79.671 PAIN IN BOTH FEET: Primary | ICD-10-CM

## 2024-03-06 NOTE — TELEPHONE ENCOUNTER
Patient is calling and states she needs a PRUETT referral  For Dr Era Mckeon  Podiatry.  Patient has an appt this Friday 3/8/24 @ 8:30am.  Patient is going in for a toe nail issue and a orthotic issue.    Dr Mckeon  Phone  [phone]  Fax  [phone]    Please call patient when referral has been approved

## 2024-03-06 NOTE — TELEPHONE ENCOUNTER
Pt has referral placed that expires 9/27/24/ Left detailed vm (ok per HIPAA) relaying this information to patient. Instructed to call back with questions or concerns.

## 2024-03-07 NOTE — TELEPHONE ENCOUNTER
Patient callilng.  Patient contacted Dr. Mckeon office, she has no visits left on referral.   Needs new referral.   Patient appointment is tomorrow 3/8/2024.    Fax [phone]

## 2024-03-07 NOTE — TELEPHONE ENCOUNTER
Noted, new referral generated per protocol and faxed to office of Dr. Mckeon. Patient made aware. Verbalized understanding.

## 2024-03-14 NOTE — TELEPHONE ENCOUNTER
Dr Reji Oglesby, patient requesting Rx ordered because her husbands insurance covers his. Rx pended if agree. Subjective Finding:    Chief compalint: Patient presents with:  Back Pain: Tightness left lower back , Pain Scale: 4/10, Intensity: dull, Duration: 2 days, Radiating:  left buttock.    Date of injury:     Activities that the pain restricts:   Home/household/hobbies/social activities: Yes.  Work duties: No.  Sleep: No.  Makes symptoms better: rest.  Makes symptoms worse: lumbar flexion.  Have you seen anyone else for the symptoms? No.  Work related: No.  Automobile related injury: No.    Objective and Assessment:    Posture Analysis:   High shoulder: .  Head tilt: .  High iliac crest: left.  Head carriage: neutral.  Thoracic Kyphosis: neutral.  Lumbar Lordosis: neutral.    Lumbar Range of Motion: extension decreased.  Cervical Range of Motion: .  Thoracic Range of Motion: .  Extremity Range of Motion: .    Palpation:   Quad lumb: left, referred pain: no    Segmental dysfunction pre-treatment and treatment area: C5, T3, L5, and PSIS Left.    Assessment post-treatment:  Cervical: ROM increased.  Thoracic: ROM increased.  Lumbar: ROM increased.    Comments: .      Complicating Factors: .    Procedure(s):  CMT:  74361 Chiropractic manipulative treatment 3-4 regions performed   Cervical: Diversified, See above for level, Supine, Thoracic: Diversified, See above for level, Prone, and Lumbar: Diversified, See above for level, Side posture    Modalities:  None performed this visit    Therapeutic procedures:  None    Plan:  Treatment plan: PRN.  Instructed patient: stretch as instructed at visit.  Short term goals: reduce pain.  Long term goals: .  Prognosis: very good.

## 2024-03-16 NOTE — TELEPHONE ENCOUNTER
Covid symptoms started Dec 1  Tested positive on Dec 4  On Dec 9 pt's son came home early from school with a cough - he tested negative  Pt also got a cough, felt worse than she did when she had Covid  Taking max strength mucinex/fast max & Aleve   Feels a little better/like she may be turning the corner but is asking for guidance     Should she test again for Covid     [phone] No

## 2024-04-16 RX ORDER — FLUTICASONE PROPIONATE 50 MCG
SPRAY, SUSPENSION (ML) NASAL
Qty: 2 EACH | Refills: 0 | Status: SHIPPED | OUTPATIENT
Start: 2024-04-16

## 2024-04-16 NOTE — TELEPHONE ENCOUNTER
Due for visit. MoVoxxhart sent     Refill request is for a maintenance medication and has met the criteria specified in the Ambulatory Medication Refill Standing Order for eligibility, visits, laboratory, alerts and was sent to the requested pharmacy.    Requested Prescriptions     Signed Prescriptions Disp Refills    fluticasone propionate 50 MCG/ACT Nasal Suspension 2 each 0     Sig: SHAKE LIQUID AND USE 2 SPRAYS IN EACH NOSTRIL AT BEDTIME     Authorizing Provider: JOMAR WERNER     Ordering User: MOSHE ARNETT

## 2024-04-18 RX ORDER — FLUTICASONE PROPIONATE 50 MCG
SPRAY, SUSPENSION (ML) NASAL
Qty: 48 G | Refills: 0 | OUTPATIENT
Start: 2024-04-18

## 2024-04-18 NOTE — TELEPHONE ENCOUNTER
Current refill request refused due to refill is either a duplicate request or has active refills at the pharmacy.  Check previous templates.    Requested Prescriptions     Refused Prescriptions Disp Refills    FLUTICASONE PROPIONATE 50 MCG/ACT Nasal Suspension [Pharmacy Med Name: FLUTICASONE 50MCG NASAL SP (120) RX] 48 g 0     Sig: SHAKE LIQUID AND USE 2 SPRAYS IN EACH NOSTRIL AT BEDTIME     Refused By: MOSHE ARNETT     Reason for Refusal: Request already responded to by other means (e.g. phone or fax)

## 2024-09-17 ENCOUNTER — TELEPHONE (OUTPATIENT)
Dept: INTERNAL MEDICINE CLINIC | Facility: CLINIC | Age: 54
End: 2024-09-17

## 2024-09-17 NOTE — TELEPHONE ENCOUNTER
Patient is due for an annual exam w/PCP.   Left message to call back.   Please assist with scheduling.

## 2024-11-26 ENCOUNTER — TELEPHONE (OUTPATIENT)
Dept: INTERNAL MEDICINE CLINIC | Facility: CLINIC | Age: 54
End: 2024-11-26

## 2024-11-26 DIAGNOSIS — Z00.00 ROUTINE HEALTH MAINTENANCE: ICD-10-CM

## 2024-11-26 DIAGNOSIS — Z12.31 SCREENING MAMMOGRAM FOR BREAST CANCER: Primary | ICD-10-CM

## 2024-11-26 DIAGNOSIS — R92.30 DENSE BREASTS: ICD-10-CM

## 2024-11-26 DIAGNOSIS — M85.89 OSTEOPENIA OF MULTIPLE SITES: ICD-10-CM

## 2024-11-26 RX ORDER — FLUTICASONE PROPIONATE 50 MCG
2 SPRAY, SUSPENSION (ML) NASAL DAILY
Qty: 3 EACH | Refills: 3 | Status: SHIPPED | OUTPATIENT
Start: 2024-11-26

## 2024-11-27 NOTE — TELEPHONE ENCOUNTER
Mammo ordered  Flonase refilled  Labs ordered - please ask her to get them done prior to her appt  Can discuss Cologuard and sleep study at her appt

## 2025-01-04 ENCOUNTER — LAB ENCOUNTER (OUTPATIENT)
Dept: LAB | Age: 55
End: 2025-01-04
Attending: INTERNAL MEDICINE
Payer: COMMERCIAL

## 2025-01-04 DIAGNOSIS — Z00.00 ROUTINE HEALTH MAINTENANCE: ICD-10-CM

## 2025-01-04 LAB
ALBUMIN SERPL-MCNC: 5.1 G/DL (ref 3.2–4.8)
ALBUMIN/GLOB SERPL: 2.3 {RATIO} (ref 1–2)
ALP LIVER SERPL-CCNC: 79 U/L
ALT SERPL-CCNC: 31 U/L
ANION GAP SERPL CALC-SCNC: 6 MMOL/L (ref 0–18)
AST SERPL-CCNC: 33 U/L (ref ?–34)
BASOPHILS # BLD AUTO: 0.04 X10(3) UL (ref 0–0.2)
BASOPHILS NFR BLD AUTO: 1 %
BILIRUB SERPL-MCNC: 0.8 MG/DL (ref 0.3–1.2)
BUN BLD-MCNC: 15 MG/DL (ref 9–23)
BUN/CREAT SERPL: 17.4 (ref 10–20)
CALCIUM BLD-MCNC: 10.1 MG/DL (ref 8.7–10.4)
CHLORIDE SERPL-SCNC: 106 MMOL/L (ref 98–112)
CHOLEST SERPL-MCNC: 238 MG/DL (ref ?–200)
CO2 SERPL-SCNC: 32 MMOL/L (ref 21–32)
CREAT BLD-MCNC: 0.86 MG/DL
DEPRECATED RDW RBC AUTO: 36.1 FL (ref 35.1–46.3)
EGFRCR SERPLBLD CKD-EPI 2021: 80 ML/MIN/1.73M2 (ref 60–?)
EOSINOPHIL # BLD AUTO: 0.15 X10(3) UL (ref 0–0.7)
EOSINOPHIL NFR BLD AUTO: 3.8 %
ERYTHROCYTE [DISTWIDTH] IN BLOOD BY AUTOMATED COUNT: 11.2 % (ref 11–15)
FASTING PATIENT LIPID ANSWER: YES
FASTING STATUS PATIENT QL REPORTED: YES
GLOBULIN PLAS-MCNC: 2.2 G/DL (ref 2–3.5)
GLUCOSE BLD-MCNC: 86 MG/DL (ref 70–99)
HCT VFR BLD AUTO: 47.2 %
HDLC SERPL-MCNC: 81 MG/DL (ref 40–59)
HGB BLD-MCNC: 16.1 G/DL
IMM GRANULOCYTES # BLD AUTO: 0.01 X10(3) UL (ref 0–1)
IMM GRANULOCYTES NFR BLD: 0.3 %
LDLC SERPL CALC-MCNC: 147 MG/DL (ref ?–100)
LYMPHOCYTES # BLD AUTO: 1.34 X10(3) UL (ref 1–4)
LYMPHOCYTES NFR BLD AUTO: 33.8 %
MCH RBC QN AUTO: 30.4 PG (ref 26–34)
MCHC RBC AUTO-ENTMCNC: 34.1 G/DL (ref 31–37)
MCV RBC AUTO: 89.1 FL
MONOCYTES # BLD AUTO: 0.25 X10(3) UL (ref 0.1–1)
MONOCYTES NFR BLD AUTO: 6.3 %
NEUTROPHILS # BLD AUTO: 2.18 X10 (3) UL (ref 1.5–7.7)
NEUTROPHILS # BLD AUTO: 2.18 X10(3) UL (ref 1.5–7.7)
NEUTROPHILS NFR BLD AUTO: 54.8 %
NONHDLC SERPL-MCNC: 157 MG/DL (ref ?–130)
OSMOLALITY SERPL CALC.SUM OF ELEC: 298 MOSM/KG (ref 275–295)
PLATELET # BLD AUTO: 222 10(3)UL (ref 150–450)
POTASSIUM SERPL-SCNC: 3.9 MMOL/L (ref 3.5–5.1)
PROT SERPL-MCNC: 7.3 G/DL (ref 5.7–8.2)
RBC # BLD AUTO: 5.3 X10(6)UL
SODIUM SERPL-SCNC: 144 MMOL/L (ref 136–145)
TRIGL SERPL-MCNC: 59 MG/DL (ref 30–149)
TSI SER-ACNC: 2.66 UIU/ML (ref 0.55–4.78)
VIT D+METAB SERPL-MCNC: 52.4 NG/ML (ref 30–100)
VLDLC SERPL CALC-MCNC: 11 MG/DL (ref 0–30)
WBC # BLD AUTO: 4 X10(3) UL (ref 4–11)

## 2025-01-04 PROCEDURE — 36415 COLL VENOUS BLD VENIPUNCTURE: CPT

## 2025-01-04 PROCEDURE — 80061 LIPID PANEL: CPT

## 2025-01-04 PROCEDURE — 85025 COMPLETE CBC W/AUTO DIFF WBC: CPT

## 2025-01-04 PROCEDURE — 82306 VITAMIN D 25 HYDROXY: CPT

## 2025-01-04 PROCEDURE — 84443 ASSAY THYROID STIM HORMONE: CPT | Performed by: INTERNAL MEDICINE

## 2025-01-04 PROCEDURE — 80053 COMPREHEN METABOLIC PANEL: CPT

## 2025-01-14 ENCOUNTER — TELEPHONE (OUTPATIENT)
Dept: INTERNAL MEDICINE CLINIC | Facility: CLINIC | Age: 55
End: 2025-01-14

## 2025-01-14 ENCOUNTER — OFFICE VISIT (OUTPATIENT)
Dept: INTERNAL MEDICINE CLINIC | Facility: CLINIC | Age: 55
End: 2025-01-14

## 2025-01-14 VITALS
DIASTOLIC BLOOD PRESSURE: 68 MMHG | RESPIRATION RATE: 16 BRPM | HEIGHT: 67 IN | OXYGEN SATURATION: 100 % | HEART RATE: 75 BPM | BODY MASS INDEX: 18.52 KG/M2 | SYSTOLIC BLOOD PRESSURE: 124 MMHG | WEIGHT: 118 LBS | TEMPERATURE: 98 F

## 2025-01-14 DIAGNOSIS — R06.83 SNORING: ICD-10-CM

## 2025-01-14 DIAGNOSIS — I27.20 PULMONARY HYPERTENSION (HCC): ICD-10-CM

## 2025-01-14 DIAGNOSIS — Z00.00 ROUTINE HEALTH MAINTENANCE: Primary | ICD-10-CM

## 2025-01-14 DIAGNOSIS — M85.89 OSTEOPENIA OF MULTIPLE SITES: ICD-10-CM

## 2025-01-14 DIAGNOSIS — D75.1 POLYCYTHEMIA: ICD-10-CM

## 2025-01-14 PROCEDURE — 3074F SYST BP LT 130 MM HG: CPT | Performed by: INTERNAL MEDICINE

## 2025-01-14 PROCEDURE — 3008F BODY MASS INDEX DOCD: CPT | Performed by: INTERNAL MEDICINE

## 2025-01-14 PROCEDURE — 3078F DIAST BP <80 MM HG: CPT | Performed by: INTERNAL MEDICINE

## 2025-01-14 PROCEDURE — 99396 PREV VISIT EST AGE 40-64: CPT | Performed by: INTERNAL MEDICINE

## 2025-01-14 NOTE — PROGRESS NOTES
Evelin Tanner is a 54 year old female.  Chief Complaint   Patient presents with    Physical     ANNUAL PHYSICAL  Pap Smear 11/23/21, Mammogram 8/22/22  Reviewed Preventative/Wellness form with patient.            HPI:   Evelin Tanner is a 54 year old female who presents for a complete physical exam.   Feels well.   Last seen 1/2023    Diagnosed with left Macular degeneration; getting injections    No formal exercise; does 5000 steps/day    Still snoring despite using flonase; bothers her          Wt Readings from Last 6 Encounters:   01/14/25 118 lb (53.5 kg)   01/18/23 112 lb (50.8 kg)   11/23/21 113 lb (51.3 kg)   01/29/20 115 lb (52.2 kg)   08/13/19 109 lb (49.4 kg)   07/29/19 111 lb (50.3 kg)     Body mass index is 18.48 kg/m².       Current Outpatient Medications   Medication Sig Dispense Refill    NON FORMULARY Take 1 capsule by mouth daily. N Acetyl Cystine      fluticasone propionate 50 MCG/ACT Nasal Suspension 2 sprays by Nasal route daily. 3 each 3    Ergocalciferol (VITAMIN D OR) Take by mouth. OTC pt unaware of dose.      Ginkgo Biloba 40 MG Oral Tab Take 250 mg by mouth daily.      Lutein 20 MG Oral Cap Take 1 capsule by mouth daily.      ibuprofen 200 MG Oral Tab Take 1 tablet (200 mg total) by mouth every 6 (six) hours as needed for Pain.      ELDERBERRY OR Take by mouth.      ST DONNELLY WORT OR Take 1 tablet by mouth daily.      Omega-3 Fatty Acids (FISH OIL OR) Take 1 capsule by mouth daily.        Cyanocobalamin (VITAMIN B-12 CR OR) Take 1 tablet by mouth daily.      Calcium Carbonate-Vitamin D (CALCIUM-VITAMIN D) 500-200 MG-UNIT Oral Tab Take 1 tablet by mouth daily.      ondansetron 4 MG Oral Tablet Dispersible Take 1 tablet (4 mg total) by mouth every 6 (six) hours as needed for Nausea.      Spacer/Aero-Holding Chambers Does not apply Device Use with albuterol inhaler 1 each 0    guaiFENesin-codeine (CHERATUSSIN AC) 100-10 MG/5ML Oral Solution Take 5 mL by mouth every 6 (six) hours as  needed for cough. 50 mL 0    magnesium 250 MG Oral Tab Take 250 mg by mouth daily. (Patient not taking: Reported on 1/14/2025)      Ascorbic Acid (VITAMIN C) 100 MG Oral Tab Take 100 mg by mouth daily. (Patient not taking: Reported on 1/14/2025)        Past Medical History:    Allergic rhinitis    Arthritis    Headache    Macular degeneration    Left eye  2023      Past Surgical History:   Procedure Laterality Date    Cyst aspiration left  2005      Family History   Problem Relation Age of Onset    Depression Maternal Grandmother     Migraines Sister     Heart Disease Other         family h/o CAD    Lipids Other     Allergies Cousin     Asthma Cousin     Breast Cancer Paternal Aunt     Depression Mother     Depression Sister     Pulmonary Disease Paternal Grandfather       Social History:   Social History     Socioeconomic History    Marital status:    Tobacco Use    Smoking status: Never    Smokeless tobacco: Never   Vaping Use    Vaping status: Never Used   Substance and Sexual Activity    Alcohol use: Yes     Alcohol/week: 1.0 standard drink of alcohol     Types: 1 Glasses of wine per week     Comment: Approximately 2 glasses a week.    Drug use: No   Other Topics Concern    Caffeine Concern Yes     Comment: Coffee 1 cup daily    Reaction to local anesthetic No     Social Drivers of Health     Food Insecurity: No Food Insecurity (1/14/2025)    NCSS - Food Insecurity     Worried About Running Out of Food in the Last Year: No     Ran Out of Food in the Last Year: No   Transportation Needs: No Transportation Needs (1/14/2025)    NCSS - Transportation     Lack of Transportation: No   Housing Stability: Not At Risk (1/14/2025)    NCSS - Housing/Utilities     Has Housing: Yes     Worried About Losing Housing: No     Unable to Get Utilities: No          REVIEW OF SYSTEMS:   GENERAL: feels well otherwise  SKIN: denies any unusual skin lesions  EYES:denies blurred vision or double vision  HEENT: denies nasal  congestion, sinus pain or ST  LUNGS: denies shortness of breath with exertion or cough  CARDIOVASCULAR: denies chest pain, pressure, or palpitations  GI: denies abdominal pain, nausea, vomiting, diarrhea, constipation, hematochezia, or melena  NEURO: denies headaches or dizziness    EXAM:   /68   Pulse 75   Temp 98 °F (36.7 °C) (Oral)   Resp 16   Ht 5' 7\" (1.702 m)   Wt 118 lb (53.5 kg)   LMP 08/12/2016 (Within Days)   SpO2 100%   BMI 18.48 kg/m²     GENERAL: well developed, well nourished, in no apparent distress  HEENT: normal oropharynx, normal TM's  EYES: PERRLA, EOMI, conjunctivae are pink  NECK: supple, no cervical or supraclavicular LAD, no carotid bruits  BREAST: no dominant or suspicious mass, no axillary LAD  LUNGS: clear to auscultation  CARDIO: RRR, normal S1S2, 2/6 late crescendo murmur loudest at LUSB  GI: soft, NT, ND, NABS, no HSM  EXTREMITIES: no cyanosis, clubbing or edema, +2 DP pulses      ASSESSMENT AND PLAN:     Routine health maintenance  Pap/HPV normal 11/2021  Mammo scheduled  Tdap  8/13/19   Rec Shingrix shingles vaccine.    Cologuard neg in 10/2020 -- due for repeat; Rx given  Had flu vax     Osteopenia, mult sites  DEXA 4/8/23 - osteopenia, femur/spine; FRAX okay  Repeat in 4/2025    Murmur  Echo 12/2017 - neg  Echo 3/25/23 - normal LV; no significant valve abnormalities (mild TR)    Left macular degeneration  -seeing Dr. Jeanmarie Butts  -has been getting injections    Hypercholesterolemia  -LDL up (89>147); HDL 81, TG 59  -sister w/high cholesterol  -encouraged healthy Mediterranean diet  -repeat next year    Polycythemia  -Hgb 16.1  -push fluids; repeat 1-2 weeks    Snoring  -given borderline PAP on echo, will check sleep study        RTC 1 yr.     Nora Fabian MD, 01/14/25, 11:41 AM

## 2025-01-16 ENCOUNTER — TELEPHONE (OUTPATIENT)
Dept: SLEEP CENTER | Age: 55
End: 2025-01-16

## 2025-02-08 ENCOUNTER — HOSPITAL ENCOUNTER (OUTPATIENT)
Dept: MAMMOGRAPHY | Facility: HOSPITAL | Age: 55
Discharge: HOME | End: 2025-02-08
Attending: INTERNAL MEDICINE
Payer: COMMERCIAL

## 2025-02-08 DIAGNOSIS — Z12.31 SCREENING MAMMOGRAM FOR BREAST CANCER: ICD-10-CM

## 2025-02-08 DIAGNOSIS — R92.30 DENSE BREASTS: ICD-10-CM

## 2025-02-08 PROCEDURE — 77067 SCR MAMMO BI INCL CAD: CPT | Performed by: INTERNAL MEDICINE

## 2025-02-08 PROCEDURE — 77063 BREAST TOMOSYNTHESIS BI: CPT | Performed by: INTERNAL MEDICINE

## 2025-02-27 ENCOUNTER — TELEPHONE (OUTPATIENT)
Dept: SLEEP CENTER | Age: 55
End: 2025-02-27

## 2025-03-14 ENCOUNTER — TELEPHONE (OUTPATIENT)
Dept: INTERNAL MEDICINE CLINIC | Facility: CLINIC | Age: 55
End: 2025-03-14

## 2025-03-19 ENCOUNTER — TELEPHONE (OUTPATIENT)
Dept: SLEEP CENTER | Age: 55
End: 2025-03-19

## 2025-03-31 ENCOUNTER — OFFICE VISIT (OUTPATIENT)
Dept: SLEEP CENTER | Age: 55
End: 2025-03-31
Attending: INTERNAL MEDICINE
Payer: COMMERCIAL

## 2025-03-31 DIAGNOSIS — R06.83 SNORING: ICD-10-CM

## 2025-03-31 DIAGNOSIS — I27.20 PULMONARY HYPERTENSION (HCC): ICD-10-CM

## 2025-03-31 PROCEDURE — 95806 SLEEP STUDY UNATT&RESP EFFT: CPT

## 2025-04-03 DIAGNOSIS — G47.33 OSA (OBSTRUCTIVE SLEEP APNEA): Primary | ICD-10-CM

## 2025-04-10 ENCOUNTER — TELEPHONE (OUTPATIENT)
Dept: SLEEP CENTER | Age: 55
End: 2025-04-10

## 2025-04-11 ENCOUNTER — HOSPITAL ENCOUNTER (OUTPATIENT)
Dept: BONE DENSITY | Age: 55
Discharge: HOME | End: 2025-04-11
Attending: INTERNAL MEDICINE
Payer: COMMERCIAL

## 2025-04-11 ENCOUNTER — LAB ENCOUNTER (OUTPATIENT)
Dept: LAB | Age: 55
End: 2025-04-11
Attending: INTERNAL MEDICINE
Payer: COMMERCIAL

## 2025-04-11 DIAGNOSIS — D75.1 POLYCYTHEMIA: ICD-10-CM

## 2025-04-11 DIAGNOSIS — M85.89 OSTEOPENIA OF MULTIPLE SITES: ICD-10-CM

## 2025-04-11 LAB
BASOPHILS # BLD AUTO: 0.06 X10(3) UL (ref 0–0.2)
BASOPHILS NFR BLD AUTO: 1 %
DEPRECATED RDW RBC AUTO: 35.7 FL (ref 35.1–46.3)
EOSINOPHIL # BLD AUTO: 0.14 X10(3) UL (ref 0–0.7)
EOSINOPHIL NFR BLD AUTO: 2.4 %
ERYTHROCYTE [DISTWIDTH] IN BLOOD BY AUTOMATED COUNT: 11 % (ref 11–15)
HCT VFR BLD AUTO: 40 % (ref 35–48)
HGB BLD-MCNC: 13.8 G/DL (ref 12–16)
IMM GRANULOCYTES # BLD AUTO: 0.01 X10(3) UL (ref 0–1)
IMM GRANULOCYTES NFR BLD: 0.2 %
LYMPHOCYTES # BLD AUTO: 1.57 X10(3) UL (ref 1–4)
LYMPHOCYTES NFR BLD AUTO: 27.1 %
MCH RBC QN AUTO: 31 PG (ref 26–34)
MCHC RBC AUTO-ENTMCNC: 34.5 G/DL (ref 31–37)
MCV RBC AUTO: 89.9 FL (ref 80–100)
MONOCYTES # BLD AUTO: 0.47 X10(3) UL (ref 0.1–1)
MONOCYTES NFR BLD AUTO: 8.1 %
NEUTROPHILS # BLD AUTO: 3.55 X10 (3) UL (ref 1.5–7.7)
NEUTROPHILS # BLD AUTO: 3.55 X10(3) UL (ref 1.5–7.7)
NEUTROPHILS NFR BLD AUTO: 61.2 %
PLATELET # BLD AUTO: 198 10(3)UL (ref 150–450)
RBC # BLD AUTO: 4.45 X10(6)UL (ref 3.8–5.3)
WBC # BLD AUTO: 5.8 X10(3) UL (ref 4–11)

## 2025-04-11 PROCEDURE — 77080 DXA BONE DENSITY AXIAL: CPT | Performed by: INTERNAL MEDICINE

## 2025-04-11 PROCEDURE — 36415 COLL VENOUS BLD VENIPUNCTURE: CPT

## 2025-04-11 PROCEDURE — 85025 COMPLETE CBC W/AUTO DIFF WBC: CPT

## 2025-04-21 ENCOUNTER — PATIENT MESSAGE (OUTPATIENT)
Dept: INTERNAL MEDICINE CLINIC | Facility: CLINIC | Age: 55
End: 2025-04-21

## 2025-05-05 NOTE — TELEPHONE ENCOUNTER
Patient scheduled to see Dr Fabian on Jan 14  Asks if  would order sleep study prior to her appointment    Requests orders for mammogram, colonoscopy - patient prefers to have Cologuard, and any other tests she may be due for     Patient is out of refills for Flonase   Requests new prescription     Please call patient to advise [phone]            declines

## 2025-05-30 ENCOUNTER — OFFICE VISIT (OUTPATIENT)
Dept: SLEEP CENTER | Age: 55
End: 2025-05-30
Attending: INTERNAL MEDICINE
Payer: COMMERCIAL

## 2025-05-30 DIAGNOSIS — G47.33 OSA (OBSTRUCTIVE SLEEP APNEA): Primary | ICD-10-CM

## 2025-05-30 PROCEDURE — 95811 POLYSOM 6/>YRS CPAP 4/> PARM: CPT

## 2025-06-03 ENCOUNTER — ORDER TRANSCRIPTION (OUTPATIENT)
Dept: SLEEP CENTER | Age: 55
End: 2025-06-03

## 2025-06-03 DIAGNOSIS — G47.33 OBSTRUCTIVE SLEEP APNEA (ADULT) (PEDIATRIC): Primary | ICD-10-CM

## 2025-07-06 NOTE — PROGRESS NOTES
Initial Pulmonary Medicine Outpatient Consultation           Reason for Consultation and CC: BLANCA     Referring Physician: Dr. Fabian       HPI: I had the pleasure of seeing Evelin Tanner for a Pulmonary Medicine consultation on 7/7/25  54 yo woman with hx of recent sleep study evaluation found to have moderate to severe BLANCA with AHI 29. Subsequent CPAP titration in 5/2025 showed needs to be treated with CPAP @ 10 cmH20   Pt here for further mgmt.   Sleep symptoms consist of snoring. Wakes up tired in the morning but attributed to stress.   Does take daytime naps.   No previous hx of other pulmonary problems.       REVIEW OF SYSTEMS:  Positives and negatives as stated in HPI. Remainder of 12 pt review of systems otherwise are negative.       PAST MEDICAL HISTORY:  Past Medical History[1]       PAST SURGICAL HISTORY:  Past Surgical History[2]       PAST FAMILY HISTORY:  Family History[3]  Sister has BLANCA      PAST SOCIAL HISTORY:  Short Social Hx on File[4]  Never smoked  Lives with  and son  No pets  Employed: [employer]      ALLERGIES:  Allergies[5]       MEDS:  Medications Ordered Prior to Encounter[6]       PHYSICAL EXAM:  /63   Pulse 88   Ht 5' 7\" (1.702 m)   Wt 123 lb (55.8 kg)   LMP 08/12/2016 (Within Days)   SpO2 98%   BMI 19.26 kg/m²   CONSTITUTIONAL: alert, oriented, no apparent distress  HEENT: atraumatic normocephalic  MOUTH: mucous membranes are moist. No OP exudates. MMP 2  NECK/THROAT: no JVD. Trachea midline. No obvious thyromegaly  LUNG: clear b/l no wheezing, crackles. Chest symmetric with respiratory motion  HEART: regular rate and rhythm, no obvious murmers or gallops note  ABD: soft non tender. + bowel sounds. No organomegaly noted  EXT: no clubbing, cyanosis, or edema noted. Pulses intact grossly  NEURO/MUSCULOSKELETAL: no gross deficits  SKIN: warm, dry. No obvious lesions noted  LYMPH: no obvious LAD       IMAGES:  CXR 11/2023  1. No acute disease in the chest.   Suggestion of mild hyperinflation.  Correlate clinically.        LABS:  Lab Results   Component Value Date    WBC 5.8 04/11/2025    RBC 4.45 04/11/2025    HGB 13.8 04/11/2025    HCT 40.0 04/11/2025    MCV 89.9 04/11/2025    MCH 31.0 04/11/2025    MCHC 34.5 04/11/2025    MPV 10.2 02/27/2018     No results for input(s): \"GLU\", \"BUN\", \"CREATSERUM\", \"GFRAA\", \"GFRNAA\", \"EGFRCR\", \"CA\", \"ALB\", \"NA\", \"K\", \"CL\", \"CO2\", \"ALKPHO\", \"AST\", \"ALT\", \"BILT\", \"TP\" in the last 168 hours.       PFTS none     HST 3/2025  AHI 29  INTERPRETATION:  The data generated from this study is consistent with severe obstructive sleep apnea (ICD-10 code G47.33).  RECOMMENDATIONS:    1.       CPAP titration.     CPAP titration results 5/2025:  IMPRESSIONS: This study demonstrates successful CPAP titration.   Diagnosis: Obstructive Sleep Apnea G47.33   RECOMMENDATIONS:   1. The patient should be prescribed CPAP at 10 cm H2O, with humidity at 3 and EPR on.   2. The patient was fitted with a ResMed AirFit N20 nasal mask, size Small.        ASSESSMENT/PLAN:  Moderate to severe BLANCA  -Pt is interested in trying non CPAP treatment options  -Refer to [facility]-pt given brochure and referral placed  -Also maybe interested in [facility]. Pt given info for Dr. Nohemy RUTLEDGE at [facility]  -Advised to contact us if she decides to try PAP tx in the future  -Strongly advise absolutely NO driving when sleepy  -Recommend good sleep hygiene as discussed   -Avoid alcohol and caffeine before going to bed    RTC based on above plan and treatment options    Thank you for the opportunity to care for Evelin Tanner.    I spent a total of 32 minutes in direct contact with the patient and reviewing pertinent outside records on the day of the encounter.     NESHA Prieto DO, MPH  Pulmonary and Critical Care Medicine  [facility] Pulmonary and Critical Care Medicine          [1]   Past Medical History:   Allergic rhinitis    Arthritis    Headache     Macular degeneration    Left eye  2023   [2]   Past Surgical History:  Procedure Laterality Date    Cyst aspiration left  2005   [3]   Family History  Problem Relation Age of Onset    Depression Maternal Grandmother     Migraines Sister     Heart Disease Other         family h/o CAD    Lipids Other     Allergies Cousin     Asthma Cousin     Breast Cancer Paternal Aunt     Depression Mother     Depression Sister     Pulmonary Disease Paternal Grandfather    [4]   Social History  Socioeconomic History    Marital status:    Tobacco Use    Smoking status: Never    Smokeless tobacco: Never   Vaping Use    Vaping status: Never Used   Substance and Sexual Activity    Alcohol use: Yes     Alcohol/week: 1.0 standard drink of alcohol     Types: 1 Glasses of wine per week     Comment: Approximately 2 glasses a week.    Drug use: No   Other Topics Concern    Caffeine Concern Yes     Comment: Coffee 1 cup daily    Reaction to local anesthetic No     Social Drivers of Health     Food Insecurity: No Food Insecurity (1/14/2025)    NCSS - Food Insecurity     Worried About Running Out of Food in the Last Year: No     Ran Out of Food in the Last Year: No   Transportation Needs: No Transportation Needs (1/14/2025)    NCSS - Transportation     Lack of Transportation: No   Housing Stability: Not At Risk (1/14/2025)    NCSS - Housing/Utilities     Has Housing: Yes     Worried About Losing Housing: No     Unable to Get Utilities: No   [5] No Known Allergies  [6]   Current Outpatient Medications on File Prior to Visit   Medication Sig Dispense Refill    NON FORMULARY Take 1 capsule by mouth daily. N Acetyl Cystine      fluticasone propionate 50 MCG/ACT Nasal Suspension 2 sprays by Nasal route daily. 3 each 3    ondansetron 4 MG Oral Tablet Dispersible Take 1 tablet (4 mg total) by mouth every 6 (six) hours as needed for Nausea.      Spacer/Aero-Holding Chambers Does not apply Device Use with albuterol inhaler 1 each 0     guaiFENesin-codeine (CHERATUSSIN AC) 100-10 MG/5ML Oral Solution Take 5 mL by mouth every 6 (six) hours as needed for cough. 50 mL 0    Ergocalciferol (VITAMIN D OR) Take by mouth. OTC pt unaware of dose.      magnesium 250 MG Oral Tab Take 250 mg by mouth daily. (Patient not taking: Reported on 1/14/2025)      Ginkgo Biloba 40 MG Oral Tab Take 250 mg by mouth daily.      Lutein 20 MG Oral Cap Take 1 capsule by mouth daily.      ibuprofen 200 MG Oral Tab Take 1 tablet (200 mg total) by mouth every 6 (six) hours as needed for Pain.      Ascorbic Acid (VITAMIN C) 100 MG Oral Tab Take 100 mg by mouth daily. (Patient not taking: Reported on 1/14/2025)      ELDERBERRY OR Take by mouth.      ST DONNELLY WORT OR Take 1 tablet by mouth daily.      Omega-3 Fatty Acids (FISH OIL OR) Take 1 capsule by mouth daily.        Cyanocobalamin (VITAMIN B-12 CR OR) Take 1 tablet by mouth daily.      Calcium Carbonate-Vitamin D (CALCIUM-VITAMIN D) 500-200 MG-UNIT Oral Tab Take 1 tablet by mouth daily.       No current facility-administered medications on file prior to visit.

## 2025-07-07 ENCOUNTER — OFFICE VISIT (OUTPATIENT)
Dept: PULMONOLOGY | Facility: CLINIC | Age: 55
End: 2025-07-07

## 2025-07-07 VITALS
DIASTOLIC BLOOD PRESSURE: 63 MMHG | OXYGEN SATURATION: 98 % | BODY MASS INDEX: 19.3 KG/M2 | HEART RATE: 88 BPM | HEIGHT: 67 IN | SYSTOLIC BLOOD PRESSURE: 119 MMHG | WEIGHT: 123 LBS

## 2025-07-07 DIAGNOSIS — G47.33 OSA (OBSTRUCTIVE SLEEP APNEA): Primary | ICD-10-CM

## 2025-07-07 PROCEDURE — 3078F DIAST BP <80 MM HG: CPT | Performed by: INTERNAL MEDICINE

## 2025-07-07 PROCEDURE — 3008F BODY MASS INDEX DOCD: CPT | Performed by: INTERNAL MEDICINE

## 2025-07-07 PROCEDURE — 3074F SYST BP LT 130 MM HG: CPT | Performed by: INTERNAL MEDICINE

## 2025-07-07 PROCEDURE — 99203 OFFICE O/P NEW LOW 30 MIN: CPT | Performed by: INTERNAL MEDICINE

## 2025-07-07 NOTE — PATIENT INSTRUCTIONS
We will give you the information for [facility] to see if you are a candidate for an oral appliance    You can also look into [facility] and think about scheduling an appt with     Dr. Victorino Slater MD  [facility]  Phone# [phone]    If you decide to try CPAP therapy, please let us know    - Strongly advise absolutely NO driving when sleepy  - Recommend good sleep hygiene as discussed   - Avoid alcohol and caffeine before going to bed    Come back based on above plan and treatment options.

## (undated) NOTE — MR AVS SNAPSHOT
After Visit Summary   11/23/2021    Tianna Santos   MRN: [id number]           Visit Information     Date & Time  11/23/2021 11:00 AM Provider  Paulino Luna MD [address] [facility]  Phone  [phone] Expires    CBC WITH DIFFERENTIAL WITH PLATELET [[id number] CUSTOM]  11/23/2021 (Approximate) 28/26/2768    COMP METABOLIC PANEL (14) [[id number] CUSTOM]  11/23/2021 (Approximate) 11/23/2022    LIPID PANEL [[id number] CUSTOM]  11/23/2021 (Approximate) 11/23/2022 web-cam enabled computer or mobile device wherever you are. Video Visits cost $50 and can be paid hassle-free using a credit, debit, or health savings card. Not active on [employer]? Ask us how to get signed up today!         If you receive a survey from [employer]